# Patient Record
Sex: FEMALE | Race: WHITE | NOT HISPANIC OR LATINO | Employment: FULL TIME | URBAN - METROPOLITAN AREA
[De-identification: names, ages, dates, MRNs, and addresses within clinical notes are randomized per-mention and may not be internally consistent; named-entity substitution may affect disease eponyms.]

---

## 2017-04-10 ENCOUNTER — OFFICE VISIT (OUTPATIENT)
Dept: RETAIL CLINIC | Facility: CLINIC | Age: 22
End: 2017-04-10

## 2017-04-10 DIAGNOSIS — Z11.1 VISIT FOR TB SKIN TEST: Primary | ICD-10-CM

## 2017-04-10 PROCEDURE — 86580 TB INTRADERMAL TEST: CPT | Performed by: NURSE PRACTITIONER

## 2017-04-12 LAB
INDURATION: 0 MM (ref 0–10)
TB SKIN TEST: NEGATIVE

## 2019-06-06 ENCOUNTER — LAB (OUTPATIENT)
Dept: LAB | Facility: HOSPITAL | Age: 24
End: 2019-06-06

## 2019-06-06 ENCOUNTER — TRANSCRIBE ORDERS (OUTPATIENT)
Dept: LAB | Facility: HOSPITAL | Age: 24
End: 2019-06-06

## 2019-06-06 DIAGNOSIS — Z3A.25 25 WEEKS GESTATION OF PREGNANCY: Primary | ICD-10-CM

## 2019-06-06 DIAGNOSIS — Z3A.25 25 WEEKS GESTATION OF PREGNANCY: ICD-10-CM

## 2019-06-06 LAB
BASOPHILS # BLD AUTO: 0.02 10*3/MM3 (ref 0–0.2)
BASOPHILS NFR BLD AUTO: 0.2 % (ref 0–1.5)
DEPRECATED RDW RBC AUTO: 45.9 FL (ref 37–54)
EOSINOPHIL # BLD AUTO: 0.06 10*3/MM3 (ref 0–0.4)
EOSINOPHIL NFR BLD AUTO: 0.7 % (ref 0.3–6.2)
ERYTHROCYTE [DISTWIDTH] IN BLOOD BY AUTOMATED COUNT: 12.5 % (ref 12.3–15.4)
GLUCOSE BLD-MCNC: 78 MG/DL (ref 65–99)
HBV SURFACE AG SERPL QL IA: NORMAL
HCT VFR BLD AUTO: 37.1 % (ref 34–46.6)
HCV AB SER DONR QL: NORMAL
HGB BLD-MCNC: 11.5 G/DL (ref 12–15.9)
HIV1+2 AB SER QL: NORMAL
IMM GRANULOCYTES # BLD AUTO: 0.04 10*3/MM3 (ref 0–0.05)
IMM GRANULOCYTES NFR BLD AUTO: 0.5 % (ref 0–0.5)
LYMPHOCYTES # BLD AUTO: 2.21 10*3/MM3 (ref 0.7–3.1)
LYMPHOCYTES NFR BLD AUTO: 24.9 % (ref 19.6–45.3)
MCH RBC QN AUTO: 31.3 PG (ref 26.6–33)
MCHC RBC AUTO-ENTMCNC: 31 G/DL (ref 31.5–35.7)
MCV RBC AUTO: 101.1 FL (ref 79–97)
MONOCYTES # BLD AUTO: 0.47 10*3/MM3 (ref 0.1–0.9)
MONOCYTES NFR BLD AUTO: 5.3 % (ref 5–12)
NEUTROPHILS # BLD AUTO: 6.06 10*3/MM3 (ref 1.7–7)
NEUTROPHILS NFR BLD AUTO: 68.4 % (ref 42.7–76)
NRBC BLD AUTO-RTO: 0 /100 WBC (ref 0–0.2)
PLATELET # BLD AUTO: 195 10*3/MM3 (ref 140–450)
PMV BLD AUTO: 12.4 FL (ref 6–12)
RBC # BLD AUTO: 3.67 10*6/MM3 (ref 3.77–5.28)
TSH SERPL DL<=0.05 MIU/L-ACNC: 1.61 MIU/ML (ref 0.27–4.2)
WBC NRBC COR # BLD: 8.86 10*3/MM3 (ref 3.4–10.8)

## 2019-06-06 PROCEDURE — 84443 ASSAY THYROID STIM HORMONE: CPT

## 2019-06-06 PROCEDURE — 36415 COLL VENOUS BLD VENIPUNCTURE: CPT

## 2019-06-06 PROCEDURE — 87340 HEPATITIS B SURFACE AG IA: CPT

## 2019-06-06 PROCEDURE — 86850 RBC ANTIBODY SCREEN: CPT

## 2019-06-06 PROCEDURE — 86901 BLOOD TYPING SEROLOGIC RH(D): CPT

## 2019-06-06 PROCEDURE — 82947 ASSAY GLUCOSE BLOOD QUANT: CPT

## 2019-06-06 PROCEDURE — 80081 OBSTETRIC PANEL INC HIV TSTG: CPT

## 2019-06-06 PROCEDURE — 86900 BLOOD TYPING SEROLOGIC ABO: CPT

## 2019-06-06 PROCEDURE — 85025 COMPLETE CBC W/AUTO DIFF WBC: CPT

## 2019-06-06 PROCEDURE — 86592 SYPHILIS TEST NON-TREP QUAL: CPT

## 2019-06-06 PROCEDURE — 86803 HEPATITIS C AB TEST: CPT

## 2019-06-06 PROCEDURE — G0432 EIA HIV-1/HIV-2 SCREEN: HCPCS

## 2019-06-07 LAB
ABO GROUP BLD: NORMAL
BLD GP AB SCN SERPL QL: NEGATIVE
RH BLD: NEGATIVE
RPR SER QL: NORMAL

## 2019-06-08 LAB — RUBV IGG SERPL IA-ACNC: POSITIVE

## 2019-09-06 ENCOUNTER — HOSPITAL ENCOUNTER (INPATIENT)
Facility: HOSPITAL | Age: 24
LOS: 1 days | Discharge: HOME | End: 2019-09-07
Attending: OBSTETRICS & GYNECOLOGY | Admitting: OBSTETRICS & GYNECOLOGY

## 2019-09-06 PROBLEM — Z34.90 PREGNANCY: Status: RESOLVED | Noted: 2019-09-06 | Resolved: 2019-09-06

## 2019-09-06 PROBLEM — Z34.90 PREGNANCY: Status: ACTIVE | Noted: 2019-09-06

## 2019-09-06 LAB
ABO GROUP BLD: NORMAL
ABO GROUP BLD: NORMAL
ALP SERPL-CCNC: 100 U/L (ref 39–117)
ALT SERPL W P-5'-P-CCNC: 39 U/L (ref 1–33)
AMPHET+METHAMPHET UR QL: NEGATIVE
AMPHETAMINES UR QL: NEGATIVE
AST SERPL-CCNC: 30 U/L (ref 1–32)
BARBITURATES UR QL SCN: NEGATIVE
BENZODIAZ UR QL SCN: NEGATIVE
BILIRUB SERPL-MCNC: 0.3 MG/DL (ref 0.2–1.2)
BLD GP AB SCN SERPL QL: NEGATIVE
BUPRENORPHINE SERPL-MCNC: NEGATIVE NG/ML
CANNABINOIDS SERPL QL: NEGATIVE
COCAINE UR QL: NEGATIVE
CREAT BLD-MCNC: 0.52 MG/DL (ref 0.57–1)
DEPRECATED RDW RBC AUTO: 42.5 FL (ref 37–54)
ERYTHROCYTE [DISTWIDTH] IN BLOOD BY AUTOMATED COUNT: 12.3 % (ref 12.3–15.4)
FETAL BLEED: NEGATIVE
HCT VFR BLD AUTO: 37.5 % (ref 34–46.6)
HGB BLD-MCNC: 12.5 G/DL (ref 12–15.9)
LDH SERPL-CCNC: 285 U/L (ref 135–214)
MCH RBC QN AUTO: 31.2 PG (ref 26.6–33)
MCHC RBC AUTO-ENTMCNC: 33.3 G/DL (ref 31.5–35.7)
MCV RBC AUTO: 93.5 FL (ref 79–97)
METHADONE UR QL SCN: NEGATIVE
NUMBER OF DOSES: NORMAL
OPIATES UR QL: NEGATIVE
OXYCODONE UR QL SCN: NEGATIVE
PCP UR QL SCN: NEGATIVE
PLATELET # BLD AUTO: 192 10*3/MM3 (ref 140–450)
PMV BLD AUTO: 12.4 FL (ref 6–12)
PROPOXYPH UR QL: NEGATIVE
RBC # BLD AUTO: 4.01 10*6/MM3 (ref 3.77–5.28)
RH BLD: NEGATIVE
RH BLD: NEGATIVE
T&S EXPIRATION DATE: NORMAL
TRICYCLICS UR QL SCN: NEGATIVE
URATE SERPL-MCNC: 5.5 MG/DL (ref 2.4–5.7)
WBC NRBC COR # BLD: 7.72 10*3/MM3 (ref 3.4–10.8)

## 2019-09-06 PROCEDURE — 84075 ASSAY ALKALINE PHOSPHATASE: CPT | Performed by: NURSE PRACTITIONER

## 2019-09-06 PROCEDURE — 84550 ASSAY OF BLOOD/URIC ACID: CPT | Performed by: NURSE PRACTITIONER

## 2019-09-06 PROCEDURE — 80306 DRUG TEST PRSMV INSTRMNT: CPT | Performed by: ADVANCED PRACTICE MIDWIFE

## 2019-09-06 PROCEDURE — 84450 TRANSFERASE (AST) (SGOT): CPT | Performed by: NURSE PRACTITIONER

## 2019-09-06 PROCEDURE — 83615 LACTATE (LD) (LDH) ENZYME: CPT | Performed by: NURSE PRACTITIONER

## 2019-09-06 PROCEDURE — 82247 BILIRUBIN TOTAL: CPT | Performed by: NURSE PRACTITIONER

## 2019-09-06 PROCEDURE — 86900 BLOOD TYPING SEROLOGIC ABO: CPT | Performed by: NURSE PRACTITIONER

## 2019-09-06 PROCEDURE — 86901 BLOOD TYPING SEROLOGIC RH(D): CPT | Performed by: ADVANCED PRACTICE MIDWIFE

## 2019-09-06 PROCEDURE — 85027 COMPLETE CBC AUTOMATED: CPT | Performed by: NURSE PRACTITIONER

## 2019-09-06 PROCEDURE — 25010000002 RHO D IMMUNE GLOBULIN 1500 UNIT/2ML SOLUTION PREFILLED SYRINGE: Performed by: ADVANCED PRACTICE MIDWIFE

## 2019-09-06 PROCEDURE — 84460 ALANINE AMINO (ALT) (SGPT): CPT | Performed by: NURSE PRACTITIONER

## 2019-09-06 PROCEDURE — 86901 BLOOD TYPING SEROLOGIC RH(D): CPT | Performed by: NURSE PRACTITIONER

## 2019-09-06 PROCEDURE — 59025 FETAL NON-STRESS TEST: CPT

## 2019-09-06 PROCEDURE — 86850 RBC ANTIBODY SCREEN: CPT | Performed by: NURSE PRACTITIONER

## 2019-09-06 PROCEDURE — 82565 ASSAY OF CREATININE: CPT | Performed by: NURSE PRACTITIONER

## 2019-09-06 PROCEDURE — 85461 HEMOGLOBIN FETAL: CPT | Performed by: ADVANCED PRACTICE MIDWIFE

## 2019-09-06 PROCEDURE — 86900 BLOOD TYPING SEROLOGIC ABO: CPT | Performed by: ADVANCED PRACTICE MIDWIFE

## 2019-09-06 RX ORDER — ONDANSETRON 4 MG/1
4 TABLET, FILM COATED ORAL EVERY 8 HOURS PRN
Status: DISCONTINUED | OUTPATIENT
Start: 2019-09-06 | End: 2019-09-07 | Stop reason: HOSPADM

## 2019-09-06 RX ORDER — MAGNESIUM CARB/ALUMINUM HYDROX 105-160MG
30 TABLET,CHEWABLE ORAL ONCE
Status: DISCONTINUED | OUTPATIENT
Start: 2019-09-06 | End: 2019-09-06 | Stop reason: HOSPADM

## 2019-09-06 RX ORDER — ACETAMINOPHEN 325 MG/1
650 TABLET ORAL EVERY 4 HOURS PRN
Status: DISCONTINUED | OUTPATIENT
Start: 2019-09-06 | End: 2019-09-06 | Stop reason: HOSPADM

## 2019-09-06 RX ORDER — OXYCODONE AND ACETAMINOPHEN 10; 325 MG/1; MG/1
2 TABLET ORAL EVERY 4 HOURS PRN
Status: DISCONTINUED | OUTPATIENT
Start: 2019-09-06 | End: 2019-09-06 | Stop reason: HOSPADM

## 2019-09-06 RX ORDER — IBUPROFEN 600 MG/1
600 TABLET ORAL EVERY 6 HOURS PRN
Status: DISCONTINUED | OUTPATIENT
Start: 2019-09-06 | End: 2019-09-07 | Stop reason: HOSPADM

## 2019-09-06 RX ORDER — OXYTOCIN-SODIUM CHLORIDE 0.9% IV SOLN 30 UNIT/500ML 30-0.9/5 UT/ML-%
650 SOLUTION INTRAVENOUS ONCE
Status: COMPLETED | OUTPATIENT
Start: 2019-09-06 | End: 2019-09-06

## 2019-09-06 RX ORDER — PROMETHAZINE HYDROCHLORIDE 12.5 MG/1
12.5 TABLET ORAL EVERY 6 HOURS PRN
Status: DISCONTINUED | OUTPATIENT
Start: 2019-09-06 | End: 2019-09-06 | Stop reason: HOSPADM

## 2019-09-06 RX ORDER — ONDANSETRON 4 MG/1
4 TABLET, FILM COATED ORAL EVERY 6 HOURS PRN
Status: DISCONTINUED | OUTPATIENT
Start: 2019-09-06 | End: 2019-09-06 | Stop reason: HOSPADM

## 2019-09-06 RX ORDER — SODIUM CHLORIDE 0.9 % (FLUSH) 0.9 %
3 SYRINGE (ML) INJECTION EVERY 12 HOURS SCHEDULED
Status: DISCONTINUED | OUTPATIENT
Start: 2019-09-06 | End: 2019-09-06 | Stop reason: HOSPADM

## 2019-09-06 RX ORDER — DOCUSATE SODIUM 100 MG/1
100 CAPSULE, LIQUID FILLED ORAL 2 TIMES DAILY PRN
Status: DISCONTINUED | OUTPATIENT
Start: 2019-09-06 | End: 2019-09-07 | Stop reason: HOSPADM

## 2019-09-06 RX ORDER — PRENATAL WITH FERROUS FUM AND FOLIC ACID 3080; 920; 120; 400; 22; 1.84; 3; 20; 10; 1; 12; 200; 27; 25; 2 [IU]/1; [IU]/1; MG/1; [IU]/1; MG/1; MG/1; MG/1; MG/1; MG/1; MG/1; UG/1; MG/1; MG/1; MG/1; MG/1
1 TABLET ORAL DAILY
COMMUNITY

## 2019-09-06 RX ORDER — OXYTOCIN-SODIUM CHLORIDE 0.9% IV SOLN 30 UNIT/500ML 30-0.9/5 UT/ML-%
85 SOLUTION INTRAVENOUS ONCE
Status: COMPLETED | OUTPATIENT
Start: 2019-09-06 | End: 2019-09-06

## 2019-09-06 RX ORDER — CARBOPROST TROMETHAMINE 250 UG/ML
250 INJECTION, SOLUTION INTRAMUSCULAR AS NEEDED
Status: DISCONTINUED | OUTPATIENT
Start: 2019-09-06 | End: 2019-09-06 | Stop reason: HOSPADM

## 2019-09-06 RX ORDER — PRENATAL VIT/IRON FUM/FOLIC AC 27MG-0.8MG
1 TABLET ORAL DAILY
Status: DISCONTINUED | OUTPATIENT
Start: 2019-09-06 | End: 2019-09-07 | Stop reason: HOSPADM

## 2019-09-06 RX ORDER — MISOPROSTOL 200 UG/1
800 TABLET ORAL AS NEEDED
Status: DISCONTINUED | OUTPATIENT
Start: 2019-09-06 | End: 2019-09-06 | Stop reason: HOSPADM

## 2019-09-06 RX ORDER — SODIUM CHLORIDE 0.9 % (FLUSH) 0.9 %
1-10 SYRINGE (ML) INJECTION AS NEEDED
Status: DISCONTINUED | OUTPATIENT
Start: 2019-09-06 | End: 2019-09-07 | Stop reason: HOSPADM

## 2019-09-06 RX ORDER — ACETAMINOPHEN 325 MG/1
650 TABLET ORAL EVERY 4 HOURS PRN
Status: DISCONTINUED | OUTPATIENT
Start: 2019-09-06 | End: 2019-09-07 | Stop reason: HOSPADM

## 2019-09-06 RX ORDER — SODIUM CHLORIDE 0.9 % (FLUSH) 0.9 %
3-10 SYRINGE (ML) INJECTION AS NEEDED
Status: DISCONTINUED | OUTPATIENT
Start: 2019-09-06 | End: 2019-09-06 | Stop reason: HOSPADM

## 2019-09-06 RX ORDER — SODIUM CHLORIDE, SODIUM LACTATE, POTASSIUM CHLORIDE, CALCIUM CHLORIDE 600; 310; 30; 20 MG/100ML; MG/100ML; MG/100ML; MG/100ML
125 INJECTION, SOLUTION INTRAVENOUS CONTINUOUS
Status: DISCONTINUED | OUTPATIENT
Start: 2019-09-06 | End: 2019-09-06

## 2019-09-06 RX ORDER — BISACODYL 10 MG
10 SUPPOSITORY, RECTAL RECTAL DAILY PRN
Status: DISCONTINUED | OUTPATIENT
Start: 2019-09-07 | End: 2019-09-07 | Stop reason: HOSPADM

## 2019-09-06 RX ORDER — PROMETHAZINE HYDROCHLORIDE 12.5 MG/1
12.5 SUPPOSITORY RECTAL EVERY 6 HOURS PRN
Status: DISCONTINUED | OUTPATIENT
Start: 2019-09-06 | End: 2019-09-06 | Stop reason: HOSPADM

## 2019-09-06 RX ORDER — PROMETHAZINE HYDROCHLORIDE 25 MG/ML
12.5 INJECTION, SOLUTION INTRAMUSCULAR; INTRAVENOUS EVERY 6 HOURS PRN
Status: DISCONTINUED | OUTPATIENT
Start: 2019-09-06 | End: 2019-09-06 | Stop reason: HOSPADM

## 2019-09-06 RX ORDER — METHYLERGONOVINE MALEATE 0.2 MG/ML
200 INJECTION INTRAVENOUS ONCE AS NEEDED
Status: DISCONTINUED | OUTPATIENT
Start: 2019-09-06 | End: 2019-09-06 | Stop reason: HOSPADM

## 2019-09-06 RX ORDER — BUTORPHANOL TARTRATE 1 MG/ML
1 INJECTION, SOLUTION INTRAMUSCULAR; INTRAVENOUS
Status: DISCONTINUED | OUTPATIENT
Start: 2019-09-06 | End: 2019-09-06 | Stop reason: HOSPADM

## 2019-09-06 RX ORDER — ONDANSETRON 2 MG/ML
4 INJECTION INTRAMUSCULAR; INTRAVENOUS EVERY 6 HOURS PRN
Status: DISCONTINUED | OUTPATIENT
Start: 2019-09-06 | End: 2019-09-06 | Stop reason: HOSPADM

## 2019-09-06 RX ORDER — IBUPROFEN 600 MG/1
600 TABLET ORAL EVERY 6 HOURS PRN
Status: DISCONTINUED | OUTPATIENT
Start: 2019-09-06 | End: 2019-09-06 | Stop reason: HOSPADM

## 2019-09-06 RX ADMIN — IBUPROFEN 600 MG: 600 TABLET, FILM COATED ORAL at 21:29

## 2019-09-06 RX ADMIN — Medication: at 09:31

## 2019-09-06 RX ADMIN — WITCH HAZEL 1 PAD: 500 SOLUTION RECTAL; TOPICAL at 09:31

## 2019-09-06 RX ADMIN — DOCUSATE SODIUM 100 MG: 100 CAPSULE, LIQUID FILLED ORAL at 09:29

## 2019-09-06 RX ADMIN — IBUPROFEN 600 MG: 600 TABLET, FILM COATED ORAL at 09:29

## 2019-09-06 RX ADMIN — SODIUM CHLORIDE, POTASSIUM CHLORIDE, SODIUM LACTATE AND CALCIUM CHLORIDE 125 ML/HR: 600; 310; 30; 20 INJECTION, SOLUTION INTRAVENOUS at 06:58

## 2019-09-06 RX ADMIN — PRENATAL VIT W/ FE FUMARATE-FA TAB 27-0.8 MG 1 TABLET: 27-0.8 TAB at 09:29

## 2019-09-06 RX ADMIN — OXYTOCIN 650 ML/HR: 10 INJECTION INTRAVENOUS at 07:32

## 2019-09-06 RX ADMIN — HUMAN RHO(D) IMMUNE GLOBULIN 1500 UNITS: 1500 SOLUTION INTRAMUSCULAR; INTRAVENOUS at 17:41

## 2019-09-06 RX ADMIN — OXYTOCIN 85 ML/HR: 10 INJECTION INTRAVENOUS at 08:33

## 2019-09-06 RX ADMIN — HYDROCORTISONE 2.5% 1 APPLICATION: 25 CREAM TOPICAL at 09:31

## 2019-09-06 NOTE — L&D DELIVERY NOTE
[facility]  Vaginal Delivery Note    Delivery     Delivery: Vaginal, Spontaneous     YOB: 2020    Time of Birth:  Gestational Age 7:26 AM   39w0d     Anesthesia: None     Delivering clinician: Gely Do    Forceps?   No   Vacuum? No    Shoulder dystocia present: No        Delivery narrative:  Laura pushed effectively to deliver a live born male infant over one contraction over an intact perineum with no anesthesia. Infant vigorous at delivery and placed on maternal abdomen where after cord stopped pulsing and was milked x4 cord was double clamped and cut by patient. Placenta delivered spontaneously and was visually intact. Fundus firm. Careful inspection of the perineum reveal no lacerations which required repair.     Infant    Findings: male  infant     Infant observations: Weight: 3170 g (6 lb 15.8 oz)   Length: 18.5  in  Observations/Comments:         Apgars: 8   @ 1 minute /    9   @ 5 minutes   Infant Name: Nash     Placenta, Cord, and Fluid    Placenta delivered  Spontaneous  at   9/6/2019  7:32 AM     Cord:    present.   Nuchal Cord?  no   Cord blood obtained:   yes   Cord gases obtained:    no             Repair    Episiotomy: None    Lacerations: No   Estimated Blood Loss:  100ml           Complications  none    Disposition  Mother to Mother Baby/Postpartum  in stable condition currently.  Baby to remains with mom  in stable condition currently.      Gely Do CNM  09/06/19  7:45 AM

## 2019-09-06 NOTE — H&P
[facility]  Obstetric History and Physical    Chief Complaint   Patient presents with   • Rupture of Membranes       Subjective     Patient is a 24 y.o. female  currently at 39w0d, who presents with SROM at 0500. She reports clear fluid. She reports good fetal movement. She denies vaginal bleeding. She has had one prenatal visit with DOLORES Garcia at 19 weeks. She did not receive rhogam this pregnancy.   Her prenatal care is complicated by  insufficient prenatal care .  Her previous obstetric/gynecological history is noted for three previous natural vaginal deliveries.    The following portions of the patients history were reviewed and updated as appropriate: current medications, allergies, past medical history, past surgical history, past family history, past social history and problem list .       Prenatal Information:  Prenatal Results     Initial Prenatal Labs     Test Value Reference Range Date Time    Hemoglobin        Hematocrit        Platelets 192 10*3/mm3 140 - 450 10*3/mm3 19 0658    Rubella IgG Positive   19 1209    Hepatitis B SAg Non-Reactive  Non-Reactive 19 1209    Hepatitis C Ab Non-Reactive  Non-Reactive 19 1209    RPR Non-Reactive  Non-Reactive 19 1209    ABO A   19 1209    Rh Negative   19 1209    Antibody Screen        HIV Non-Reactive  Non-Reactive 19 1209    Urine Culture        Gonorrhea        Chlamydia        TSH 1.610 mIU/mL 0.270 - 4.200 mIU/mL 19 1209          2nd and 3rd Trimester     Test Value Reference Range Date Time    Hemoglobin (repeated) 12.5 g/dL 12.0 - 15.9 g/dL 19 0658    Hematocrit (repeated) 37.5 % 34.0 - 46.6 % 19 0658    GCT        Antibody Screen (repeated) Negative   19 1209    GTT Fasting        GTT 1 Hr        GTT 2 Hr        GTT 3 Hr        Group B Strep              Drug Screening     Test Value Reference Range Date Time    Amphetamine Screen        Barbiturate Screen        Benzodiazepine  Screen        Methadone Screen        Phencyclidine Screen        Opiates Screen        THC Screen        Cocaine Screen        Propoxyphene Screen        Buprenorphine Screen        Methamphetamine Screen        Oxycodone Screen        Tricyclic Antidepressants Screen              Other (Risk screening)     Test Value Reference Range Date Time    Varicella IgG        Parvovirus IgG        CMV IgG        Cystic Fibrosis        Hemoglobin electrophoresis        NIPT        MSAFP-4        AFP (for NTD only)                  External Prenatal Results     Pregnancy Outside Results - Transcribed From Office Records - See Scanned Records For Details     Test Value Date Time    Hgb 12.5 g/dL 09/06/19 0658    Hct 37.5 % 09/06/19 0658    ABO A  06/06/19 1209    Rh Negative  06/06/19 1209    Antibody Screen Negative  06/06/19 1209    Glucose Fasting GTT       Glucose Tolerance Test 1 hour       Glucose Tolerance Test 3 hour       Gonorrhea (discrete)       Chlamydia (discrete)       RPR Non-Reactive  06/06/19 1209    VDRL       Syphilis Antibody       Rubella Positive  06/06/19 1209    HBsAg Non-Reactive  06/06/19 1209    Herpes Simplex Virus PCR       Herpes Simplex VIrus Culture       HIV Non-Reactive  06/06/19 1209    Hep C RNA Quant PCR       Hep C Antibody Non-Reactive  06/06/19 1209    AFP       Group B Strep       GBS Susceptibility to Clindamycin       GBS Susceptibility to Erythromycin       Fetal Fibronectin       Genetic Testing, Maternal Blood             Drug Screening     Test Value Date Time    Urine Drug Screen       Amphetamine Screen Negative ng/mL 09/29/14 0915    Barbiturate Screen Negative ng/mL 09/29/14 0915    Benzodiazepine Screen Negative ng/mL 09/29/14 0915    Methadone Screen Negative ng/mL 09/29/14 0915    Phencyclidine Screen Negative ng/mL 09/29/14 0915    Opiates Screen       THC Screen       Cocaine Screen       Propoxyphene Screen Negative ng/mL 09/29/14 0915    Buprenorphine Screen Negative  ng/mL 14 0915    Methamphetamine Screen       Oxycodone Screen Negative ng/mL 14 0915    Tricyclic Antidepressants Screen                    Past OB History:     Obstetric History       T3      L3     SAB0   TAB0   Ectopic0   Molar0   Multiple0   Live Births3       # Outcome Date GA Lbr Antoni/2nd Weight Sex Delivery Anes PTL Lv   4 Current            3 Term     M Vag-Spont   VERONIKA   2 Term     F Vag-Spont   VERONIKA   1 Term     F Vag-Spont   VERNOIKA          Past Medical History: History reviewed. No pertinent past medical history.   Past Surgical History Past Surgical History:   Procedure Laterality Date   • LAPAROSCOPIC CHOLECYSTECTOMY     • WISDOM TOOTH EXTRACTION        Family History: No family history on file.   Social History:  reports that she has never smoked. She has never used smokeless tobacco.   reports that she does not drink alcohol.   reports that she does not use drugs.        Review of Systems      Objective     Vital Signs Range for the last 24 hours  Temperature: Temp:  [98.1 °F (36.7 °C)] 98.1 °F (36.7 °C)   Temp Source: Temp src: Oral   BP: BP: (147-154)/() 154/87   Pulse: Heart Rate:  [86-87] 87   Respirations: Resp:  [18] 18   SPO2:     O2 Amount (l/min):     O2 Devices     Weight: Weight:  [115 kg (254 lb)] 115 kg (254 lb)     Physical Examination: General appearance - alert, well appearing, and in no distress  Mental status - alert, oriented to person, place, and time  Chest - clear to auscultation, no wheezes, rales or rhonchi, symmetric air entry  Heart - normal rate, regular rhythm, normal S1, S2, no murmurs, rubs, clicks or gallops  Abdomen - soft, nontender, nondistended, no masses or organomegaly  Musculoskeletal - no joint tenderness, deformity or swelling  Extremities - peripheral pulses normal, 1+ pedal edema, no clubbing or cyanosis  Skin - normal coloration and turgor, no rashes, no suspicious skin lesions noted    Presentation: vertex   Cervix: Exam by:  s  perfecto ventura   Dilation: Cervical Dilation (cm): 9   Effacement: Cervical Effacement: 80-90%   Station:  0     Fetal Heart Rate Assessment   Method: Fetal HR Assessment Method: external   Beats/min: Fetal HR (beats/min): 135   Baseline: Fetal Heart Baseline Rate: normal range   Variability: Fetal HR Variability: moderate (amplitude range 6 to 25 bpm)   Accels: Fetal HR Accelerations: greater than/equal to 15 bpm, lasting at least 15 seconds   Decels: Fetal HR Decelerations: absent   Tracing Category:       Uterine Assessment   Method: Method: external tocotransducer   Frequency (min):     Ctx Count in 10 min:     Duration:     Intensity: Contraction Intensity: no contractions   Intensity by IUPC:     Resting Tone: Uterine Resting Tone: soft by palpation   Resting Tone by IUPC:     [place] Units:         Assessment/Plan       Pregnancy      Assessment & Plan    Assessment:  1.  Intrauterine pregnancy at 39w0d gestation with reactive, reassuring fetal status.    2.  labor  with ROM  3.  Obstetrical history significant for three previous full term vaginal deliveries, one prenatal visit at 19 weeks this pregnancy.  4.  GBS status: unknown    Plan:  1. fetal and uterine monitoring  continuously and expectant management  2. Plan of care has been reviewed with patient   3.  Risks, benefits of treatment plan have been discussed.  4.  All questions have been answered.        Gely Do CNM  9/6/2019  7:40 AM

## 2019-09-06 NOTE — CONSULTS
Continued Stay Note  [facility]     Patient Name: Laura Riley  MRN: [id number]  Today's Date: 9/6/2019    Admit Date: 9/6/2019    Discharge Plan     Row Name 09/06/19 1543       Plan    Plan Comments  Spoke with pt. Reports she plans to have an adoption plan. [facility]         Discharge Codes    No documentation.             AMY Barney

## 2019-09-07 VITALS
HEART RATE: 65 BPM | BODY MASS INDEX: 37.62 KG/M2 | HEIGHT: 69 IN | TEMPERATURE: 97.9 F | WEIGHT: 254 LBS | DIASTOLIC BLOOD PRESSURE: 72 MMHG | SYSTOLIC BLOOD PRESSURE: 111 MMHG | RESPIRATION RATE: 16 BRPM

## 2019-09-07 LAB
BASOPHILS # BLD AUTO: 0.03 10*3/MM3 (ref 0–0.2)
BASOPHILS NFR BLD AUTO: 0.4 % (ref 0–1.5)
DEPRECATED RDW RBC AUTO: 45.2 FL (ref 37–54)
EOSINOPHIL # BLD AUTO: 0.09 10*3/MM3 (ref 0–0.4)
EOSINOPHIL NFR BLD AUTO: 1.1 % (ref 0.3–6.2)
ERYTHROCYTE [DISTWIDTH] IN BLOOD BY AUTOMATED COUNT: 12.6 % (ref 12.3–15.4)
HCT VFR BLD AUTO: 32.9 % (ref 34–46.6)
HCT VFR BLD AUTO: 32.9 % (ref 34–46.6)
HGB BLD-MCNC: 10.3 G/DL (ref 12–15.9)
HGB BLD-MCNC: 10.3 G/DL (ref 12–15.9)
IMM GRANULOCYTES # BLD AUTO: 0.04 10*3/MM3 (ref 0–0.05)
IMM GRANULOCYTES NFR BLD AUTO: 0.5 % (ref 0–0.5)
LYMPHOCYTES # BLD AUTO: 2.38 10*3/MM3 (ref 0.7–3.1)
LYMPHOCYTES NFR BLD AUTO: 27.9 % (ref 19.6–45.3)
MCH RBC QN AUTO: 30.9 PG (ref 26.6–33)
MCHC RBC AUTO-ENTMCNC: 31.3 G/DL (ref 31.5–35.7)
MCV RBC AUTO: 98.8 FL (ref 79–97)
MONOCYTES # BLD AUTO: 0.52 10*3/MM3 (ref 0.1–0.9)
MONOCYTES NFR BLD AUTO: 6.1 % (ref 5–12)
NEUTROPHILS # BLD AUTO: 5.48 10*3/MM3 (ref 1.7–7)
NEUTROPHILS NFR BLD AUTO: 64 % (ref 42.7–76)
NRBC BLD AUTO-RTO: 0 /100 WBC (ref 0–0.2)
PLATELET # BLD AUTO: 154 10*3/MM3 (ref 140–450)
PMV BLD AUTO: 12.8 FL (ref 6–12)
RBC # BLD AUTO: 3.33 10*6/MM3 (ref 3.77–5.28)
WBC NRBC COR # BLD: 8.54 10*3/MM3 (ref 3.4–10.8)

## 2019-09-07 PROCEDURE — 85018 HEMOGLOBIN: CPT | Performed by: NURSE PRACTITIONER

## 2019-09-07 PROCEDURE — 85014 HEMATOCRIT: CPT | Performed by: NURSE PRACTITIONER

## 2019-09-07 PROCEDURE — 85025 COMPLETE CBC W/AUTO DIFF WBC: CPT | Performed by: NURSE PRACTITIONER

## 2019-09-07 RX ORDER — IBUPROFEN 600 MG/1
600 TABLET ORAL EVERY 6 HOURS PRN
Qty: 60 TABLET | Refills: 0 | Status: SHIPPED | OUTPATIENT
Start: 2019-09-07

## 2019-09-07 RX ADMIN — DOCUSATE SODIUM 100 MG: 100 CAPSULE, LIQUID FILLED ORAL at 08:49

## 2019-09-07 RX ADMIN — PRENATAL VIT W/ FE FUMARATE-FA TAB 27-0.8 MG 1 TABLET: 27-0.8 TAB at 08:49

## 2019-09-07 NOTE — PROGRESS NOTES
[facility]  Vaginal Delivery Progress Note    Subjective     Doing well, pain controlled, lochia less than menses  Baby up for adoption. Patient would like to be discharged home today.    Objective     Vital Signs Range for the last 24 hours  Temperature: Temp:  [97.8 °F (36.6 °C)-98.3 °F (36.8 °C)] 97.9 °F (36.6 °C)   Temp Source: Temp src: Oral   BP: BP: (107-154)/() 125/58   Pulse: Heart Rate:  [64-94] 94   Respirations: Resp:  [16-18] 16   SPO2:     O2 Amount (l/min):     O2 Devices           Physical Exam:  General:  no acute distresss.  Abdomen: Soft, non-tender, fundus firm  Extremities: normal, atraumatic, no cyanosis, and trace edema.       Lab results reviewed:  Yes    Lab Results   Component Value Date    WBC 7.72 2019    HGB 12.5 2019    HCT 37.5 2019    MCV 93.5 2019     2019         Assessment/Plan        (normal spontaneous vaginal delivery)      Laura Riley is Day 1  post-partum       Plan:  Discharge home with standard precautions and return to clinic in 4-6 weeks.  Will review postpartum labs prior to discharge    Gely Do CNM  2019  6:11 AM

## 2019-09-07 NOTE — DISCHARGE SUMMARY
[facility]  Delivery Discharge Summary    Primary OB Clinician:     EDC: Estimated Date of Delivery: 19    Gestational Age:39w0d    Date of Admission: 2019    Admission Diagnosis:  labor    Discharge Diagnosis: Same    Date of Delivery: 2019   Time of Delivery: 7:26 AM     Delivered By:  Gely Do     Delivery Type: Vaginal, Spontaneous          Baby:@BABYNOHDR(SEX)@ infant;   Apgar:  8   @ 1 minute /   Apgar:  9   @ 5 minutes   Weight: @BABYNOHDR(BIRTHWEIGHT)@   Length: @BABYNOHDR(DELRECITEM,HSB,28743,,,,)@    Anesthesia: None      Laceration: No    Exam:  Normal postpartum exam    Hospital Course:  Hospital course has been stable.  Patient is tolerating po, voiding without difficulty and ready for discharge.  Please see medication reconciliation and lab report for additional details.  Infant placed for adoption.    Follow Up:  Patient has been given a follow up appointment in our office in 4 weeks. Pelvic rest for 6 weeks. Postpartum depression, mastitis, and lochia precautions reviewed.    Discharge Date: 2019; Discharge Time: 6:31 AM

## 2019-09-07 NOTE — PLAN OF CARE
Problem: Patient Care Overview  Goal: Plan of Care Review  Outcome: Ongoing (interventions implemented as appropriate)   09/07/19 2370   Coping/Psychosocial   Plan of Care Reviewed With patient   Plan of Care Review   Progress improving   OTHER   Outcome Summary doing well, VSS, voiding without problems, wants to go home today , very caring with baby and very hopeful that she picked good parents for her child.       Problem: Postpartum (Vaginal Delivery) (Adult,Obstetrics,Pediatric)  Goal: Signs and Symptoms of Listed Potential Problems Will be Absent, Minimized or Managed (Postpartum)  Outcome: Ongoing (interventions implemented as appropriate)

## 2022-08-01 ENCOUNTER — TELEPHONE (OUTPATIENT)
Dept: FAMILY MEDICINE CLINIC | Facility: CLINIC | Age: 27
End: 2022-08-01

## 2023-02-23 ENCOUNTER — OFFICE VISIT (OUTPATIENT)
Dept: INTERNAL MEDICINE | Facility: CLINIC | Age: 28
End: 2023-02-23
Payer: COMMERCIAL

## 2023-02-23 VITALS
SYSTOLIC BLOOD PRESSURE: 138 MMHG | TEMPERATURE: 98.2 F | DIASTOLIC BLOOD PRESSURE: 92 MMHG | BODY MASS INDEX: 44.41 KG/M2 | WEIGHT: 293 LBS | HEIGHT: 68 IN

## 2023-02-23 DIAGNOSIS — E66.01 MORBID (SEVERE) OBESITY DUE TO EXCESS CALORIES: ICD-10-CM

## 2023-02-23 DIAGNOSIS — R03.0 ELEVATED BLOOD-PRESSURE READING WITHOUT DIAGNOSIS OF HYPERTENSION: ICD-10-CM

## 2023-02-23 DIAGNOSIS — Z00.00 WELL ADULT EXAM: Primary | ICD-10-CM

## 2023-02-23 PROCEDURE — 99204 OFFICE O/P NEW MOD 45 MIN: CPT | Performed by: STUDENT IN AN ORGANIZED HEALTH CARE EDUCATION/TRAINING PROGRAM

## 2023-02-23 NOTE — PROGRESS NOTES
Office Note     Name: Laura Riley    : 1995     MRN: [id number]     Chief Complaint  Hypertension    Subjective     History of Present Illness:  Laura Riley is a 28 y.o. female who presents today for     Here to establish care  Concerned about BP    evaluation of elevated BP measurement. Onset was unknown. with stable since that time. Symptoms include feels intermittent facial flushing. Specifically denies blurred vision, chest pain, headache and palpitations. Cardiovascular risk factors are obesity (BMI >= 30 kg/m2). Family history is positive for hypertension and diabetes mellitus. Agents associated with hypertension include none. No, syncope, no shortness of breath, Never been on blood pressure medications    Describes her diet as eating take out food, cooks at home but prepares a lot of food fried.    Review of Systems:   Review of Systems   All other systems reviewed and are negative.      Past Medical History: History reviewed. No pertinent past medical history.    Past Surgical History:   Past Surgical History:   Procedure Laterality Date   • LAPAROSCOPIC CHOLECYSTECTOMY     • WISDOM TOOTH EXTRACTION         Family History:   Family History   Problem Relation Age of Onset   • Hypertension Mother        Social History:   Social History     Socioeconomic History   • Marital status: Single   Tobacco Use   • Smoking status: Never   • Smokeless tobacco: Never   Vaping Use   • Vaping Use: Never used   Substance and Sexual Activity   • Alcohol use: No   • Drug use: No   • Sexual activity: Defer       Immunizations:   Immunization History   Administered Date(s) Administered   • COVID-19 (MODERNA) 1st, 2nd, 3rd Dose Only 2021, 10/19/2021   • PPD Test 04/10/2017   • Rho (D) Immune Globulin 2019        Medications:     Current Outpatient Medications:   •  ibuprofen (ADVIL,MOTRIN) 600 MG tablet, Take 1 tablet by mouth Every 6 (Six) Hours As Needed for Mild Pain ., Disp: 60 tablet, Rfl:  "0  •  Prenatal Vit-Fe Fumarate-FA (PRENATAL 27-1) 27-1 MG tablet tablet, Take 1 tablet by mouth Daily., Disp: , Rfl:     Allergies:   Allergies   Allergen Reactions   • Bactrim [Sulfamethoxazole-Trimethoprim] Hives       Objective     Vital Signs  /92   Temp 98.2 °F (36.8 °C) (Temporal)   Ht 173 cm (68.11\")   Wt 135 kg (297 lb 12.8 oz)   BMI 45.13 kg/m²   Estimated body mass index is 45.13 kg/m² as calculated from the following:    Height as of this encounter: 173 cm (68.11\").    Weight as of this encounter: 135 kg (297 lb 12.8 oz).    Class 3 Severe Obesity (BMI >=40). Obesity-related health conditions include the following: none. Obesity is unchanged. BMI is is above average; BMI management plan is completed. We discussed low calorie, low carb based diet program, portion control and increasing exercise.      Physical Exam  Constitutional:       Appearance: Normal appearance. She is obese.   Cardiovascular:      Rate and Rhythm: Normal rate and regular rhythm.      Pulses: Normal pulses.      Heart sounds: Normal heart sounds.   Pulmonary:      Effort: Pulmonary effort is normal.      Breath sounds: Normal breath sounds.   Skin:     General: Skin is warm.      Capillary Refill: Capillary refill takes less than 2 seconds.   Neurological:      General: No focal deficit present.      Mental Status: She is alert and oriented to person, place, and time. Mental status is at baseline.   Psychiatric:         Mood and Affect: Mood normal.         Behavior: Behavior normal.          Result Review :                  Assessment and Plan     1. Well adult exam  Counseling was given to patient for the following topics: appropriate exercise, healthy eating habits, disease prevention and preventative screenings and importance of immunizations, including risks and benefits  - Comprehensive metabolic panel; Future  - Lipid panel; Future  - CBC w AUTO Differential; Future  - TSH; Future  - T4, free; Future    2. Elevated " blood-pressure reading without diagnosis of hypertension  Monitor home BP  Discussed low salt diet, increase physical activity and weight control  -will check at next visit if systolic >140, consider starting anti-htn.   - Comprehensive metabolic panel; Future    3. Morbid (severe) obesity due to excess calories (HCC)  Discussed risk associated with obesity. she was given instructions on calorie counting as well as on decreasing carbohydrate intake. she was also encouraged to start exercise regimen.  Instructed patient to download fitness magdalena on her smart phone to aid in calorie counting and exercise tracking.    - Comprehensive metabolic panel; Future       Follow Up  Return in about 3 months (around 5/23/2023) for HTN.    MD PADMINI RomeroE [address]  [facility]  [address]  [address] [address] [phone]

## 2023-10-16 ENCOUNTER — TELEPHONE (OUTPATIENT)
Dept: OBSTETRICS AND GYNECOLOGY | Facility: CLINIC | Age: 28
End: 2023-10-16
Payer: COMMERCIAL

## 2023-10-25 ENCOUNTER — TELEPHONE (OUTPATIENT)
Dept: OBSTETRICS AND GYNECOLOGY | Facility: CLINIC | Age: 28
End: 2023-10-25
Payer: COMMERCIAL

## 2023-10-26 ENCOUNTER — INITIAL PRENATAL (OUTPATIENT)
Dept: OBSTETRICS AND GYNECOLOGY | Facility: CLINIC | Age: 28
End: 2023-10-26
Payer: COMMERCIAL

## 2023-10-26 VITALS — WEIGHT: 293 LBS | BODY MASS INDEX: 45.71 KG/M2 | SYSTOLIC BLOOD PRESSURE: 142 MMHG | DIASTOLIC BLOOD PRESSURE: 88 MMHG

## 2023-10-26 DIAGNOSIS — O09.40 ENCOUNTER FOR SUPERVISION OF HIGH RISK PREGNANCY WITH GRAND MULTIPARITY, ANTEPARTUM: ICD-10-CM

## 2023-10-26 DIAGNOSIS — I10 CHRONIC HYPERTENSION: ICD-10-CM

## 2023-10-26 DIAGNOSIS — Z36.9 ENCOUNTER FOR ANTENATAL SCREENING: Primary | ICD-10-CM

## 2023-10-26 DIAGNOSIS — E66.01 MORBID OBESITY WITH BMI OF 45.0-49.9, ADULT: ICD-10-CM

## 2023-10-26 RX ORDER — LABETALOL 100 MG/1
100 TABLET, FILM COATED ORAL 2 TIMES DAILY
Qty: 60 TABLET | Refills: 3 | Status: SHIPPED | OUTPATIENT
Start: 2023-10-26

## 2023-10-26 RX ORDER — D-METHORPHAN/PE/ACETAMINOPHEN 10-5-325MG
1 CAPSULE ORAL DAILY
COMMUNITY

## 2023-10-26 NOTE — PROGRESS NOTES
Initial ob visit     CC- Here for care of pregnancy        Laura Riley is a 28 y.o. female, , who presents for her first obstetrical visit. Patient's last menstrual period was 2023. Her KAREEM is 2024, by Last Menstrual Period. Current GA is 22w0d.     Initial positive test date : 2023, UPT        Her periods are: every 4 weeks  Prior obstetric issues: H/o Oligohydramnios, IUGR, and PTD at 35 wks with G4  Patient's past medical history is significant for:  Elevated BP (138/92) in 2023 with PCP- no medicated. Patient was to f/u with PCP in 3 months. Patient did not f/u with PCP.   .  Family history of genetic issues (includes FOB): None  Prior infections concerning in pregnancy (Rash, fever in last 2 weeks): No  Varicella Hx - vaccinated  Prior testing for Cystic Fibrosis Carrier or Sickle Cell Trait- None  Prepregnancy BMI - Body mass index is 45.71 kg/m².  History of STD: No  Hx of HSV for patient or partner:  No  Ultrasound Today: Yes    OB History    Para Term  AB Living   6 5 4 1   5   SAB IAB Ectopic Molar Multiple Live Births           0 5      # Outcome Date GA Lbr Antoni/2nd Weight Sex Delivery Anes PTL Lv   6 Current            5 Term 19 39w0d / 00:01 3170 g (6 lb 15.8 oz) M Vag-Spont None N VERONIKA   4  18 35w5d  2070 g (4 lb 9 oz) F Vag-Spont   VERONIKA      Birth Comments: Given up for Adoption, Club Foot, poss GI stricture per previous prenatal record      Complications: Oligohydramnios, IUGR (intrauterine growth restriction) affecting care of mother, Club foot   3 Term 16 40w0d  2807 g (6 lb 3 oz) M Vag-Spont   VERONIKA      Birth Comments: [facility]   2 Term 14 40w0d  3629 g (8 lb) F Vag-Spont   VERONIKA   1 Term 12 40w0d  2892 g (6 lb 6 oz) F Vag-Spont   VERONIKA      Obstetric Comments   G1- FOB 1- [facility]   G2- FOB 1- [facility]   G3- FOB 2- [facility]   G4- Poss Club Foot, GI Stricture, Oligohydramnios, and IUGR- FOB 1- [facility]   G5- FOB 1-    G6- FOB 2- [facility]  "      Additional Pertinent History   Last Pap : 2020- normal per patient  Last Completed Pap Smear       This patient has no relevant Health Maintenance data.          History of abnormal Pap smear:  No  Family history of uterine, colon, breast, or ovarian cancer:  No  Feelings of Anxiety or Depression:  No  Tobacco Usage?: No   Alcohol/Drug Use?: NO  Over the age of 35 at delivery: No  Desires Genetic Screening: None  Flu Status: Declines    PMH    Current Outpatient Medications:     Pediatric Multiple Vitamins (Flinstones Gummies Omega-3 DHA) chewable tablet, Chew 1 tablet Daily., Disp: , Rfl:      History reviewed. No pertinent past medical history.     Past Surgical History:   Procedure Laterality Date    LAPAROSCOPIC CHOLECYSTECTOMY      WISDOM TOOTH EXTRACTION         Review of Systems   Review of Systems    Patient Reports:  Headaches, Vision changes, Heartburn, Nausea, and Vomiting. Patient describes vision changes as \"black dots\". Patient states that this occurred a couple of months ago while she was at work. Patient states that this occurred prior to developing a HA. Patient denies taking Tylenol with HA. Patient states that she just lets the HA \"run its course\". Patient reports vomiting 1-2 times each morning. Patient states that she is vomiting stomach acid each morning.   Patient Denies:  Swelling, Low back pain, Vaginal bleeding, LOF, Etc.  Fetal Movement: Present  All systems reviewed and otherwise normal.    I have reviewed and agree with the HPI, ROS, and historical information as entered above.     /88   Wt (!) 137 kg (301 lb 9.6 oz)   LMP 05/25/2023   BMI 45.71 kg/m²     The additional following portions of the patient's history were reviewed and updated as appropriate: allergies, current medications, past family history, past medical history, past social history, past surgical history, and problem list.    Physical Exam  General:  well developed; well nourished  no acute distress "   Chest/Respiratory: No labored breathing, normal respiratory effort, normal appearance, no respiratory noises noted   Heart:  normal rate, regular rhythm,  no murmurs, rubs, or gallops   Thyroid: normal to inspection and palpation   Breasts:  Not performed.   Abdomen: soft, non-tender; no masses  no umbilical or inguinal hernias are present  no hepato-splenomegaly   Pelvis: Pap today        Assessment and Plan    Problem List Items Addressed This Visit          Cardiac and Vasculature    Chronic hypertension    Relevant Orders    AlP+ALT+AST+Creat+LD+TBili+..       Gravid and     Encounter for supervision of high risk pregnancy with grand multiparity, antepartum     Other Visit Diagnoses       Encounter for  screening    -  Primary    Relevant Orders    Obstetric Panel    HIV-1 / O / 2 Ag / Antibody    Urine Culture - Urine, Urine, Clean Catch    Urinalysis With Microscopic - Urine, Clean Catch    Urine Drug Screen - Urine, Clean Catch    LIQUID-BASED PAP SMEAR WITH HPV GENOTYPING REGARDLESS OF INTERPRETATION (GUSTAVO,COR,MAD)    AlP+ALT+AST+Creat+LD+TBili+..    Morbid obesity with BMI of 45.0-49.9, adult                Pregnancy at 22w0d  Reviewed routine prenatal care with the office and educational materials given  Discussed options for genetic testing including first trimester nuchal translucency screen, genetic disease carrier testing, quadruple screen, and NIPT  CHTN - start labetalol.  Check labs and 24 hour urine. F/U two weeks  Desires sterilization at delivery if c/s or postpartum.  Consent signed.  U/S incomplete.  Repeat at [facility] in 4 weeks.  Return in about 2 weeks (around 2023).      Keturah Kern MD  10/26/2023

## 2023-10-27 LAB
ABO GROUP BLD: NORMAL
ALP SERPL-CCNC: 53 IU/L (ref 44–121)
ALT SERPL-CCNC: 12 IU/L (ref 0–32)
AMPHETAMINES UR QL SCN: NEGATIVE NG/ML
APPEARANCE UR: ABNORMAL
AST SERPL-CCNC: 13 IU/L (ref 0–40)
BACTERIA #/AREA URNS HPF: ABNORMAL /[HPF]
BARBITURATES UR QL SCN: NEGATIVE NG/ML
BASOPHILS # BLD AUTO: 0 X10E3/UL (ref 0–0.2)
BASOPHILS NFR BLD AUTO: 0 %
BENZODIAZ UR QL SCN: NEGATIVE NG/ML
BILIRUB SERPL-MCNC: <0.2 MG/DL (ref 0–1.2)
BILIRUB UR QL STRIP: NEGATIVE
BLD GP AB SCN SERPL QL: NEGATIVE
BZE UR QL SCN: NEGATIVE NG/ML
CANNABINOIDS UR QL SCN: NEGATIVE NG/ML
CASTS URNS QL MICRO: ABNORMAL /LPF
COLOR UR: YELLOW
CREAT SERPL-MCNC: 0.47 MG/DL (ref 0.57–1)
CREAT UR-MCNC: 148.8 MG/DL (ref 20–300)
EGFRCR SERPLBLD CKD-EPI 2021: 132 ML/MIN/1.73
EOSINOPHIL # BLD AUTO: 0 X10E3/UL (ref 0–0.4)
EOSINOPHIL NFR BLD AUTO: 0 %
EPI CELLS #/AREA URNS HPF: ABNORMAL /HPF (ref 0–10)
ERYTHROCYTE [DISTWIDTH] IN BLOOD BY AUTOMATED COUNT: 12.5 % (ref 11.7–15.4)
GLUCOSE UR QL STRIP: NEGATIVE
HBV SURFACE AG SERPL QL IA: NEGATIVE
HCT VFR BLD AUTO: 34.3 % (ref 34–46.6)
HCV IGG SERPL QL IA: NON REACTIVE
HGB BLD-MCNC: 11.3 G/DL (ref 11.1–15.9)
HGB UR QL STRIP: NEGATIVE
HIV 1+2 AB+HIV1 P24 AG SERPL QL IA: NON REACTIVE
IMM GRANULOCYTES # BLD AUTO: 0.1 X10E3/UL (ref 0–0.1)
IMM GRANULOCYTES NFR BLD AUTO: 1 %
KETONES UR QL STRIP: NEGATIVE
LABORATORY COMMENT REPORT: NORMAL
LDH SERPL L TO P-CCNC: 154 IU/L (ref 119–226)
LEUKOCYTE ESTERASE UR QL STRIP: ABNORMAL
LYMPHOCYTES # BLD AUTO: 1.7 X10E3/UL (ref 0.7–3.1)
LYMPHOCYTES NFR BLD AUTO: 17 %
MCH RBC QN AUTO: 31.7 PG (ref 26.6–33)
MCHC RBC AUTO-ENTMCNC: 32.9 G/DL (ref 31.5–35.7)
MCV RBC AUTO: 96 FL (ref 79–97)
METHADONE UR QL SCN: NEGATIVE NG/ML
MICRO URNS: ABNORMAL
MONOCYTES # BLD AUTO: 0.5 X10E3/UL (ref 0.1–0.9)
MONOCYTES NFR BLD AUTO: 5 %
NEUTROPHILS # BLD AUTO: 7.7 X10E3/UL (ref 1.4–7)
NEUTROPHILS NFR BLD AUTO: 77 %
NITRITE UR QL STRIP: NEGATIVE
OPIATES UR QL SCN: NEGATIVE NG/ML
OXYCODONE+OXYMORPHONE UR QL SCN: NEGATIVE NG/ML
PCP UR QL: NEGATIVE NG/ML
PH UR STRIP: 6 [PH] (ref 5–7.5)
PH UR: 6 [PH] (ref 4.5–8.9)
PLATELET # BLD AUTO: 209 X10E3/UL (ref 150–450)
PROPOXYPH UR QL SCN: NEGATIVE NG/ML
PROT UR QL STRIP: ABNORMAL
RBC # BLD AUTO: 3.57 X10E6/UL (ref 3.77–5.28)
RBC #/AREA URNS HPF: ABNORMAL /HPF (ref 0–2)
RH BLD: NEGATIVE
RPR SER QL: NON REACTIVE
RUBV IGG SERPL IA-ACNC: 1.61 INDEX
SP GR UR STRIP: 1.02 (ref 1–1.03)
URATE SERPL-MCNC: 3.4 MG/DL (ref 2.6–6.2)
UROBILINOGEN UR STRIP-MCNC: 1 MG/DL (ref 0.2–1)
WBC # BLD AUTO: 10 X10E3/UL (ref 3.4–10.8)
WBC #/AREA URNS HPF: ABNORMAL /HPF (ref 0–5)

## 2023-10-29 LAB
BACTERIA UR CULT: NORMAL
BACTERIA UR CULT: NORMAL

## 2023-10-30 LAB — REF LAB TEST METHOD: NORMAL

## 2023-10-31 RX ORDER — METRONIDAZOLE 500 MG/1
2000 TABLET ORAL ONCE
Qty: 4 TABLET | Refills: 0 | Status: SHIPPED | OUTPATIENT
Start: 2023-10-31 | End: 2023-11-02

## 2023-11-02 ENCOUNTER — TELEPHONE (OUTPATIENT)
Dept: OBSTETRICS AND GYNECOLOGY | Facility: CLINIC | Age: 28
End: 2023-11-02
Payer: COMMERCIAL

## 2023-11-02 NOTE — TELEPHONE ENCOUNTER
Pt calling to f/u on labs. Notified of positive Trich on pap, rx for Flagyl was already sent in. Reviewed partner needing to be treated and she will need MAYKEL. She already has a KYLE apt for 11/9/2034, so she can do the MAYKEL at a later apt.

## 2023-12-05 ENCOUNTER — ROUTINE PRENATAL (OUTPATIENT)
Dept: OBSTETRICS AND GYNECOLOGY | Facility: CLINIC | Age: 28
End: 2023-12-05
Payer: COMMERCIAL

## 2023-12-05 VITALS — WEIGHT: 293 LBS | SYSTOLIC BLOOD PRESSURE: 126 MMHG | DIASTOLIC BLOOD PRESSURE: 70 MMHG | BODY MASS INDEX: 45.12 KG/M2

## 2023-12-05 DIAGNOSIS — Z11.3 SCREENING EXAMINATION FOR STD (SEXUALLY TRANSMITTED DISEASE): Primary | ICD-10-CM

## 2023-12-05 DIAGNOSIS — Z34.90 PRENATAL CARE, ANTEPARTUM: ICD-10-CM

## 2023-12-05 DIAGNOSIS — O09.40 ENCOUNTER FOR SUPERVISION OF HIGH RISK PREGNANCY WITH GRAND MULTIPARITY, ANTEPARTUM: ICD-10-CM

## 2023-12-05 DIAGNOSIS — O21.9 NAUSEA/VOMITING IN PREGNANCY: ICD-10-CM

## 2023-12-05 DIAGNOSIS — I10 CHRONIC HYPERTENSION: ICD-10-CM

## 2023-12-05 DIAGNOSIS — Z36.2 ENCOUNTER FOR FOLLOW-UP ULTRASOUND OF FETAL ANATOMY: ICD-10-CM

## 2023-12-05 DIAGNOSIS — Z34.90 FUNDAL HEIGHT HIGH FOR DATES: ICD-10-CM

## 2023-12-05 DIAGNOSIS — E66.01 MORBID OBESITY WITH BMI OF 45.0-49.9, ADULT: ICD-10-CM

## 2023-12-05 LAB
GLUCOSE UR STRIP-MCNC: NEGATIVE MG/DL
PROT UR STRIP-MCNC: NEGATIVE MG/DL

## 2023-12-05 RX ORDER — ONDANSETRON 4 MG/1
4 TABLET, ORALLY DISINTEGRATING ORAL EVERY 8 HOURS PRN
Qty: 20 TABLET | Refills: 0 | Status: SHIPPED | OUTPATIENT
Start: 2023-12-05

## 2023-12-05 NOTE — PROGRESS NOTES
OB FOLLOW UP  CC- Here for care of pregnancy        Laura Riley is a 28 y.o.  27w5d patient being seen today for her obstetrical follow up. Patient reports nausea with vomiting every single day in the morning. After she vomits, the nausea resolves and she feels better. RN instructed her to try vit B6 and unisom for nausea and gave her information about safe meds to take during pregnancy.    She needs MAYKEL for trich today. She reports taking meds and no symptoms currently. She reports missing her last two appts due to illness. She needs glucola testing, because it has not been completed yet.       MBT: A-  Rhogam: will be given today.  28 week packet: reviewed with patient , counseled on fetal movement , pediatrician list reviewed, breast pump discussed, and childbirth classes reviewed  TDAP: will need at next visit- clinic out  Flu Status: Declines  Ultrasound Today: No    Her prenatal care is complicated by (and status) :  Chronic HTN. She does not monitor her BP regularly. RN instructed her to start monitoring it regularly.  Patient Active Problem List   Diagnosis     (normal spontaneous vaginal delivery)    Encounter for supervision of high risk pregnancy with grand multiparity, antepartum    Chronic hypertension    Fundal height high for dates    Nausea/vomiting in pregnancy    Morbid obesity with BMI of 45.0-49.9, adult         ROS -   Patient Reports : Nausea and vomiting  Patient Denies: Loss of Fluid, Vaginal Spotting, Vision Changes, and Contractions  Fetal Movement : normal    The additional following portions of the patient's history were reviewed and updated as appropriate: allergies, current medications, past family history, past medical history, past social history, past surgical history, and problem list.    I have reviewed and agree with the HPI, ROS, and historical information as entered above. Marlys Hall, APRN      /70   Wt 135 kg (297 lb 11.2 oz)   LMP 2023    BMI 45.12 kg/m²         EXAM:     Prenatal Vitals  BP: 126/70  Weight: 135 kg (297 lb 11.2 oz)   Fetal Heart Rate: 130      Fundal Height (cm): 31 cm        Urine Glucose Read-only: Negative  Urine Protein Read-only: Negative         Assessment and Plan    Problem List Items Addressed This Visit          Cardiac and Vasculature    Chronic hypertension    Overview     NOB-Start Labetalol 100mg BID  Baseline PEP  24 hour urine (pt did not do)         Relevant Medications    labetalol (NORMODYNE) 100 MG tablet    Other Relevant Orders    [facility]    Preeclampsia Panel       Endocrine and Metabolic    Morbid obesity with BMI of 45.0-49.9, adult    Relevant Orders    [facility]       Gravid and     Encounter for supervision of high risk pregnancy with grand multiparity, antepartum    Relevant Orders    [facility]    Nausea/vomiting in pregnancy    Relevant Medications    ondansetron ODT (ZOFRAN-ODT) 4 MG disintegrating tablet       Other    Fundal height high for dates    Relevant Orders    [facility]     Other Visit Diagnoses       Screening examination for STD (sexually transmitted disease)    -  Primary    Relevant Orders    Trichomonas vaginalis, PCR - , Cervix    Prenatal care, antepartum        Relevant Orders    POC Urinalysis Dipstick (Completed)    Rhogam Immune Globulin Immunization (Completed)    CBC (No Diff)    ABO / Rh    Antibody Screen    Gestational Screen 1 Hr ([facility])    Encounter for follow-up ultrasound of fetal anatomy        Relevant Orders    [facility]            Pregnancy at 27w5d  TDAP next visit, Rhogam today, and glucola will be done on Friday.  Medication(s) Ordered  U/S ordered at follow up-PDC referral   Patient is on Prenatal vitamins  Discussed/encouraged Flu vaccination  Discussed/encouraged TDAP vaccination after 28 weeks  Reviewed Pre-eclampsia  signs/symptoms  Discussed bASA for PIH prevention from 12 to 36wk  Test of cure for trichomonas today. (Green thin discharge)  Signs of labor reviewed such as contractions five minutes apart getting closer together and stronger, decreased fetal movement, vaginal bleeding, or leaking of fluid. Reviewed Pre-eclampsia signs/symptoms-Headache not relieved by tylenol or rest, chest pain, shortness of breath, right upper quadrant pain, blurry vision-go to labor and delivery if office is closed or call office if open, patient verbalized understanding.   CBC, PEP, 1 Hour glucola, and ABO ordered for Friday.  11. Follow up ultrasound with PDC  12. Follow up after PDC here. (Attempted to call patient x3 times-sent note to front office to follow up)    Marlys Hall, APRN  12/05/2023

## 2023-12-06 LAB — T VAGINALIS RRNA SPEC QL NAA+PROBE: POSITIVE

## 2023-12-08 DIAGNOSIS — A59.9 TRICHOMONAS INFECTION: Primary | ICD-10-CM

## 2023-12-08 RX ORDER — METRONIDAZOLE 500 MG/1
500 TABLET ORAL 2 TIMES DAILY
Qty: 14 TABLET | Refills: 0 | Status: SHIPPED | OUTPATIENT
Start: 2023-12-08 | End: 2023-12-18

## 2023-12-12 PROBLEM — O26.893 RH NEGATIVE STATUS DURING PREGNANCY IN THIRD TRIMESTER: Status: ACTIVE | Noted: 2023-12-12

## 2023-12-12 PROBLEM — Z67.91 RH NEGATIVE STATUS DURING PREGNANCY IN THIRD TRIMESTER: Status: ACTIVE | Noted: 2023-12-12

## 2023-12-20 ENCOUNTER — OFFICE VISIT (OUTPATIENT)
Dept: OBSTETRICS AND GYNECOLOGY | Facility: HOSPITAL | Age: 28
End: 2023-12-20
Payer: COMMERCIAL

## 2023-12-20 ENCOUNTER — HOSPITAL ENCOUNTER (OUTPATIENT)
Dept: WOMENS IMAGING | Facility: HOSPITAL | Age: 28
Discharge: HOME | End: 2023-12-20
Payer: COMMERCIAL

## 2023-12-20 VITALS
HEIGHT: 68 IN | WEIGHT: 293 LBS | BODY MASS INDEX: 44.41 KG/M2 | SYSTOLIC BLOOD PRESSURE: 120 MMHG | DIASTOLIC BLOOD PRESSURE: 56 MMHG

## 2023-12-20 DIAGNOSIS — I10 CHRONIC HYPERTENSION: ICD-10-CM

## 2023-12-20 DIAGNOSIS — Z34.90 FUNDAL HEIGHT HIGH FOR DATES: ICD-10-CM

## 2023-12-20 DIAGNOSIS — I10 CHRONIC HYPERTENSION: Primary | ICD-10-CM

## 2023-12-20 DIAGNOSIS — E66.01 MORBID OBESITY WITH BMI OF 45.0-49.9, ADULT: ICD-10-CM

## 2023-12-20 DIAGNOSIS — O09.40 ENCOUNTER FOR SUPERVISION OF HIGH RISK PREGNANCY WITH GRAND MULTIPARITY, ANTEPARTUM: ICD-10-CM

## 2023-12-20 DIAGNOSIS — Z36.2 ENCOUNTER FOR FOLLOW-UP ULTRASOUND OF FETAL ANATOMY: ICD-10-CM

## 2023-12-20 PROCEDURE — 76811 OB US DETAILED SNGL FETUS: CPT

## 2023-12-20 NOTE — ASSESSMENT & PLAN NOTE
Concern for chronic hypertension based on elevated blood pressures at new OB visit at 22 weeks and elevated blood pressure outside of pregnancy with primary care doctor.  Patient is currently on labetalol 100 mg twice daily and blood pressure today is 120/56.  Patient had baseline serum preeclampsia evaluation which was normal but never completed 24-hour urine protein.  Today's ultrasound shows normal growth and overall normal anatomy the limited cardiac evaluation secondary to fetal positioning and maternal body habitus.  Plan for repeat growth ultrasound in 4 weeks.  Careful preeclampsia return precautions were reviewed.

## 2023-12-20 NOTE — PROGRESS NOTES
Pt reports she had never been told she had high bp until her visit with [facility] and was placed on BP meds that day 11/2023, Pt denies ha, blurry vision and epigastric pain. Next f/u with [facility] office is tomorrow, JOHNNY herrmann

## 2023-12-20 NOTE — LETTER
2023       No Recipients    Patient: Laura Riley   YOB: 1995   Date of Visit: 2023       Dear LYNN Delacruz,    Thank you for referring Laura Riley to me for evaluation. Below is a copy of my consult note.    If you have questions, please do not hesitate to call me. I look forward to following Laura along with you.         Sincerely,        Guille Jensen MD        CC:   No Recipients    Pt reports she had never been told she had high bp until her visit with [facility] and was placed on BP meds that day 2023, Pt denies ha, blurry vision and epigastric pain. Next f/u with [facility] office is tomorrow, NIPT declined         Maternal/Fetal Medicine Consult Note   Date: 2023  Name: Laura Riley    : 1995     MRN: [id number]     Referring Provider: LYNN Delacruz    Chief Complaint  GHTN, SO, Hx of PTD     Subjective     History of Present Illness:  Laura Riley is a 28 y.o.  29w6d who presents today for consult secondary to chronic hypertension versus gestational hypertension    KAREEM: Estimated Date of Delivery: 24     ROS:   Otherwise Noted in HPI    Past Medical History:   Diagnosis Date   • Gestational hypertension 2023    placed on labetalol bid      Past Surgical History:   Procedure Laterality Date   • LAPAROSCOPIC CHOLECYSTECTOMY     • WISDOM TOOTH EXTRACTION        OB History          6    Para   5    Term   4       1    AB   0    Living   5         SAB   0    IAB   0    Ectopic   0    Molar   0    Multiple   0    Live Births   5          Obstetric Comments   G1- FOB 1- [facility]  G2- FOB 1- [facility]  [facility]- [place] [place]  G4- Poss Club Foot, GI Stricture, Oligohydramnios, and IUGR- FOB 1- - given up for adoption   G5- FOB 1- [facility]  G6- FOB 2- [facility]    Fob #1 - Pregnancy #1 and #2  Fob #2 - Pregnancy #3-#6                Current Outpatient Medications:   •  labetalol (NORMODYNE) 100 MG tablet, Take 1 tablet by mouth 2 (Two) Times a  "Day., Disp: 60 tablet, Rfl: 3  •  ondansetron ODT (ZOFRAN-ODT) 4 MG disintegrating tablet, Place 1 tablet on the tongue Every 8 (Eight) Hours As Needed for Nausea or Vomiting., Disp: 20 tablet, Rfl: 0  •  Pediatric Multiple Vitamins (Flinstones Gummies Omega-3 DHA) chewable tablet, Chew 1 tablet Daily., Disp: , Rfl:     Objective     Vital Signs  /56   Ht 171.5 cm (67.5\")   Wt 134 kg (294 lb 9.6 oz)   LMP 2023   Estimated body mass index is 45.46 kg/m² as calculated from the following:    Height as of this encounter: 171.5 cm (67.5\").    Weight as of this encounter: 134 kg (294 lb 9.6 oz).    Ultrasound Impression:   See Viewpoint     Assessment and Plan     Laura Riley is a 28 y.o.  29w6d who presents today for consult secondary to gestational hypertension versus chronic hypertension    Diagnoses and all orders for this visit:    1. Chronic hypertension (Primary)  Assessment & Plan:  Concern for chronic hypertension based on elevated blood pressures at new OB visit at 22 weeks and elevated blood pressure outside of pregnancy with primary care doctor.  Patient is currently on labetalol 100 mg twice daily and blood pressure today is 120/56.  Patient had baseline serum preeclampsia evaluation which was normal but never completed 24-hour urine protein.  Today's ultrasound shows normal growth and overall normal anatomy the limited cardiac evaluation secondary to fetal positioning and maternal body habitus.  Plan for repeat growth ultrasound in 4 weeks.  Careful preeclampsia return precautions were reviewed.           Follow Up  Follow-up in 4 weeks    I spent 15 minutes caring for the patient on the day of service. This included: obtaining or reviewing a separately obtained medical history, reviewing patient records, performing a medically appropriate exam and/or evaluation, counseling or educating the patient/family/caregiver, ordering medications, labs, and/or procedures and documenting such " in the medical record. This does not include time spent on review and interpretation of other tests such as fetal ultrasound or the performance of other procedures such as amniocentesis or CVS.      Guille Jensen MD, FACOG  Maternal Fetal Medicine, [facility]

## 2023-12-20 NOTE — PROGRESS NOTES
"    Maternal/Fetal Medicine Consult Note   Date: 2023  Name: Laura Riley    : 1995     MRN: [id number]     Referring Provider: LYNN Delacruz    Chief Complaint  GHTN, SO, Hx of PTD     Subjective     History of Present Illness:  Laura Riley is a 28 y.o.  29w6d who presents today for consult secondary to chronic hypertension versus gestational hypertension    KAREEM: Estimated Date of Delivery: 24     ROS:   Otherwise Noted in HPI    Past Medical History:   Diagnosis Date    Gestational hypertension 2023    placed on labetalol bid      Past Surgical History:   Procedure Laterality Date    LAPAROSCOPIC CHOLECYSTECTOMY      WISDOM TOOTH EXTRACTION        OB History          6    Para   5    Term   4       1    AB   0    Living   5         SAB   0    IAB   0    Ectopic   0    Molar   0    Multiple   0    Live Births   5          Obstetric Comments   G1- FOB 1- [facility]  G2- FOB 1- [facility]  [facility]- FOB 2- [facility]  G4- Poss Club Foot, GI Stricture, Oligohydramnios, and IUGR- FOB 1- [facility]- given up for adoption   G5- FOB 1- [facility]  G6- FOB 2- [facility]    Fob #1 - Pregnancy #1 and #2  Fob #2 - Pregnancy #3-#6                Current Outpatient Medications:     labetalol (NORMODYNE) 100 MG tablet, Take 1 tablet by mouth 2 (Two) Times a Day., Disp: 60 tablet, Rfl: 3    ondansetron ODT (ZOFRAN-ODT) 4 MG disintegrating tablet, Place 1 tablet on the tongue Every 8 (Eight) Hours As Needed for Nausea or Vomiting., Disp: 20 tablet, Rfl: 0    Pediatric Multiple Vitamins (Flinstones Gummies Omega-3 DHA) chewable tablet, Chew 1 tablet Daily., Disp: , Rfl:     Objective     Vital Signs  /56   Ht 171.5 cm (67.5\")   Wt 134 kg (294 lb 9.6 oz)   LMP 2023   Estimated body mass index is 45.46 kg/m² as calculated from the following:    Height as of this encounter: 171.5 cm (67.5\").    Weight as of this encounter: 134 kg (294 lb 9.6 oz).    Ultrasound Impression:   See Viewpoint     Assessment and Plan "     Laura Riley is a 28 y.o.  29w6d who presents today for consult secondary to gestational hypertension versus chronic hypertension    Diagnoses and all orders for this visit:    1. Chronic hypertension (Primary)  Assessment & Plan:  Concern for chronic hypertension based on elevated blood pressures at new OB visit at 22 weeks and elevated blood pressure outside of pregnancy with primary care doctor.  Patient is currently on labetalol 100 mg twice daily and blood pressure today is 120/56.  Patient had baseline serum preeclampsia evaluation which was normal but never completed 24-hour urine protein.  Today's ultrasound shows normal growth and overall normal anatomy the limited cardiac evaluation secondary to fetal positioning and maternal body habitus.  Plan for repeat growth ultrasound in 4 weeks.  Careful preeclampsia return precautions were reviewed.           Follow Up  Follow-up in 4 weeks    I spent 15 minutes caring for the patient on the day of service. This included: obtaining or reviewing a separately obtained medical history, reviewing patient records, performing a medically appropriate exam and/or evaluation, counseling or educating the patient/family/caregiver, ordering medications, labs, and/or procedures and documenting such in the medical record. This does not include time spent on review and interpretation of other tests such as fetal ultrasound or the performance of other procedures such as amniocentesis or CVS.      Guille Jensen MD, FACOG  Maternal Fetal Medicine, [facility]

## 2023-12-21 ENCOUNTER — ROUTINE PRENATAL (OUTPATIENT)
Dept: OBSTETRICS AND GYNECOLOGY | Facility: CLINIC | Age: 28
End: 2023-12-21
Payer: COMMERCIAL

## 2023-12-21 VITALS — DIASTOLIC BLOOD PRESSURE: 70 MMHG | WEIGHT: 293 LBS | SYSTOLIC BLOOD PRESSURE: 120 MMHG | BODY MASS INDEX: 45.46 KG/M2

## 2023-12-21 DIAGNOSIS — Z34.93 PRENATAL CARE IN THIRD TRIMESTER: Primary | ICD-10-CM

## 2023-12-21 DIAGNOSIS — I10 CHRONIC HYPERTENSION: ICD-10-CM

## 2023-12-21 DIAGNOSIS — O26.893 RH NEGATIVE STATUS DURING PREGNANCY IN THIRD TRIMESTER: ICD-10-CM

## 2023-12-21 DIAGNOSIS — O09.40 ENCOUNTER FOR SUPERVISION OF HIGH RISK PREGNANCY WITH GRAND MULTIPARITY, ANTEPARTUM: ICD-10-CM

## 2023-12-21 DIAGNOSIS — Z67.91 RH NEGATIVE STATUS DURING PREGNANCY IN THIRD TRIMESTER: ICD-10-CM

## 2023-12-21 NOTE — PROGRESS NOTES
OB FOLLOW UP  CC- Here for care of pregnancy        Laura Riley is a 28 y.o.  30w0d patient being seen today for her obstetrical follow up visit. Patient reports no complaints. Unable to void at this time.    Her prenatal care is complicated by (and status) :    Patient Active Problem List   Diagnosis     (normal spontaneous vaginal delivery)    Encounter for supervision of high risk pregnancy with grand multiparity, antepartum    Chronic hypertension    Fundal height high for dates    Nausea/vomiting in pregnancy    Morbid obesity with BMI of 45.0-49.9, adult    Rh negative status during pregnancy in third trimester       Flu Status: Declines  TDAP status: declines  Rhogam status: was given  28 week labs: not completed yet  Ultrasound Today: No  Non Stress Test: No.      ROS -   Patient Reports : see above  Patient Denies: Loss of Fluid, Vaginal Spotting, Vision Changes, Headaches, Nausea , Vomiting , Contractions, and Epigastric pain  Fetal Movement : normal  All other systems reviewed and are negative.       The additional following portions of the patient's history were reviewed and updated as appropriate: allergies, current medications, past family history, past medical history, past social history, past surgical history, and problem list.    I have reviewed and agree with the HPI, ROS, and historical information as entered above. Gregory Correa, APRN      /70   Wt 134 kg (294 lb 9.6 oz)   LMP 2023   BMI 45.46 kg/m²       EXAM:     Prenatal Vitals  BP: 120/70  Weight: 134 kg (294 lb 9.6 oz)   Fetal Heart Rate: 148         Assessment and Plan    Problem List Items Addressed This Visit       Encounter for supervision of high risk pregnancy with grand multiparity, antepartum    Chronic hypertension    Overview     NOB-Start Labetalol 100mg BID  Baseline PEP  24 hour urine (pt did not do)         Relevant Medications    labetalol (NORMODYNE) 100 MG tablet    Rh negative  status during pregnancy in third trimester    Overview     12/5/23          Other Visit Diagnoses       Prenatal care in third trimester    -  Primary    Relevant Orders    POC Urinalysis Dipstick            Pregnancy at 30w0d  CHTN: pt reports feeling much better on the labetalol.  Pt unable to do glucose testing this morning because she needs to get to work. Will come back this afternoon. Also unable to void and will try this afternoon.  Fetal status reassuring. [facility] scan yesterday reassuring. Breech presentation. Will repeat at [facility] in 4 weeks  28 week labs reviewed.    Activity and Exercise discussed.  Fetal movement/PTL or Labor precautions  Patient is on Prenatal vitamins  Reviewed Pre-eclampsia signs/symptoms  Return in about 4 weeks (around 1/18/2024) for WAYNE Correa, APRN  12/21/2023

## 2024-01-10 ENCOUNTER — TELEPHONE (OUTPATIENT)
Dept: OBSTETRICS AND GYNECOLOGY | Facility: CLINIC | Age: 29
End: 2024-01-10
Payer: COMMERCIAL

## 2024-01-10 NOTE — TELEPHONE ENCOUNTER
Attempted to reach patient about CBC, PEP, and 1 hour glucose screen that were ordered but have not been collected. Left voice message requesting she call us back.

## 2024-01-23 ENCOUNTER — ROUTINE PRENATAL (OUTPATIENT)
Dept: OBSTETRICS AND GYNECOLOGY | Facility: CLINIC | Age: 29
End: 2024-01-23
Payer: COMMERCIAL

## 2024-01-23 ENCOUNTER — HOSPITAL ENCOUNTER (OUTPATIENT)
Dept: WOMENS IMAGING | Facility: HOSPITAL | Age: 29
Discharge: HOME | End: 2024-01-23
Admitting: OBSTETRICS & GYNECOLOGY
Payer: COMMERCIAL

## 2024-01-23 ENCOUNTER — OFFICE VISIT (OUTPATIENT)
Dept: OBSTETRICS AND GYNECOLOGY | Facility: HOSPITAL | Age: 29
End: 2024-01-23
Payer: COMMERCIAL

## 2024-01-23 ENCOUNTER — TELEPHONE (OUTPATIENT)
Dept: OBSTETRICS AND GYNECOLOGY | Facility: CLINIC | Age: 29
End: 2024-01-23

## 2024-01-23 VITALS — DIASTOLIC BLOOD PRESSURE: 76 MMHG | WEIGHT: 293 LBS | SYSTOLIC BLOOD PRESSURE: 122 MMHG | BODY MASS INDEX: 45.34 KG/M2

## 2024-01-23 VITALS — DIASTOLIC BLOOD PRESSURE: 74 MMHG | BODY MASS INDEX: 45.06 KG/M2 | SYSTOLIC BLOOD PRESSURE: 137 MMHG | WEIGHT: 292 LBS

## 2024-01-23 DIAGNOSIS — Z67.91 RH NEGATIVE STATUS DURING PREGNANCY IN THIRD TRIMESTER: ICD-10-CM

## 2024-01-23 DIAGNOSIS — O09.40 ENCOUNTER FOR SUPERVISION OF HIGH RISK PREGNANCY WITH GRAND MULTIPARITY, ANTEPARTUM: ICD-10-CM

## 2024-01-23 DIAGNOSIS — E66.01 MORBID OBESITY WITH BMI OF 45.0-49.9, ADULT: ICD-10-CM

## 2024-01-23 DIAGNOSIS — I10 CHRONIC HYPERTENSION: Primary | ICD-10-CM

## 2024-01-23 DIAGNOSIS — I10 CHRONIC HYPERTENSION: ICD-10-CM

## 2024-01-23 DIAGNOSIS — O26.893 RH NEGATIVE STATUS DURING PREGNANCY IN THIRD TRIMESTER: ICD-10-CM

## 2024-01-23 DIAGNOSIS — Z34.93 THIRD TRIMESTER PREGNANCY: Primary | ICD-10-CM

## 2024-01-23 DIAGNOSIS — O21.9 NAUSEA/VOMITING IN PREGNANCY: ICD-10-CM

## 2024-01-23 LAB
GLUCOSE UR STRIP-MCNC: NEGATIVE MG/DL
PROT UR STRIP-MCNC: NEGATIVE MG/DL

## 2024-01-23 PROCEDURE — 99214 OFFICE O/P EST MOD 30 MIN: CPT | Performed by: NURSE PRACTITIONER

## 2024-01-23 PROCEDURE — 76819 FETAL BIOPHYS PROFIL W/O NST: CPT

## 2024-01-23 PROCEDURE — 76816 OB US FOLLOW-UP PER FETUS: CPT

## 2024-01-23 RX ORDER — ONDANSETRON 4 MG/1
4 TABLET, ORALLY DISINTEGRATING ORAL EVERY 8 HOURS PRN
Qty: 30 TABLET | Refills: 1 | Status: SHIPPED | OUTPATIENT
Start: 2024-01-23

## 2024-01-23 NOTE — PROGRESS NOTES
OB FOLLOW UP  CC- Here for care of pregnancy        Laura Riley is a 29 y.o.  34w5d patient being seen today for her obstetrical follow up visit. Patient reports nausea and vomiting once a day.     Her prenatal care is complicated by (and status) :   Patient Active Problem List   Diagnosis     (normal spontaneous vaginal delivery)    Encounter for supervision of high risk pregnancy with grand multiparity, antepartum    Chronic hypertension    Fundal height high for dates    Nausea/vomiting in pregnancy    Morbid obesity with BMI of 45.0-49.9, adult    Rh negative status during pregnancy in third trimester       Flu Status: Declines  Ultrasound Today: No  Non Stress Test: No.    ROS -   Patient Reports : Nausea and Vomiting. She reports vomiting 1 times per day  Patient Denies: Loss of Fluid, Vaginal Spotting, Vision Changes, Headaches, Contractions, and Epigastric pain  Fetal Movement : normal  All other systems reviewed and are negative.       The additional following portions of the patient's history were reviewed and updated as appropriate: allergies and current medications.    I have reviewed and agree with the HPI, ROS, and historical information as entered above. Gregory Correa, APRN      /76   Wt 133 kg (293 lb 12.8 oz)   LMP 2023   BMI 45.34 kg/m²       EXAM:     Prenatal Vitals  BP: 122/76  Weight: 133 kg (293 lb 12.8 oz)   Fetal Heart Rate: PDC               Urine Glucose Read-only: Negative  Urine Protein Read-only: Negative           Assessment and Plan    Problem List Items Addressed This Visit       Encounter for supervision of high risk pregnancy with grand multiparity, antepartum    Chronic hypertension    Overview     NOB-Start Labetalol 100mg BID  Baseline PEP  24 hour urine (pt did not do)         Relevant Medications    labetalol (NORMODYNE) 100 MG tablet    Other Relevant Orders    AlP+ALT+AST+Creat+LD+TBili+..    Morbid obesity with BMI of 45.0-49.9,  adult    Rh negative status during pregnancy in third trimester    Overview     12/5/23          Other Visit Diagnoses       Third trimester pregnancy    -  Primary    Relevant Orders    POC Urinalysis Dipstick (Completed)    Gestational Screen 1 Hr ([facility])    Antibody Screen    CBC (No Diff)    AlP+ALT+AST+Creat+LD+TBili+..            Pregnancy at 34w5d  Glucola testing being done today.  Fetal status reassuring. PDC report pending. Pt was told everything look great with baby and she does not need to be seen back with them prior to delivery.  Activity and Exercise discussed.  Fetal movement/PTL or Labor precautions  Lab(s) Ordered  GBS next visit  Delivery options reviewed with pt. She would like spontaneous labor.  Return in about 2 weeks (around 2/6/2024).    Gregory Correa, APRN  01/23/2024

## 2024-01-23 NOTE — TELEPHONE ENCOUNTER
Pt calling from [facility] regarding Zofran prescription as pharmacy has indicated a prescription has not been received.

## 2024-01-23 NOTE — LETTER
"2024       No Recipients    Patient: Laura Riley   YOB: 1995   Date of Visit: 2024       Dear LYNN Delacruz,    Thank you for referring Laura Riley to me for evaluation. Below is a copy of my consult note.    If you have questions, please do not hesitate to call me. I look forward to following Laura along with you.         Sincerely,        Aminta Evans MD        CC:   No Recipients    Pt has appt with Dr. Owens's office today. NIPT declined. Pt denies vaginal bleeding, contractions, or loss of fluid.        Maternal/Fetal Medicine Follow Up Note     Name: Laura Riley    : 1995     MRN: [id number]     Referring Provider: LYNN Delacruz    Chief Complaint  CHTN    Subjective     History of Present Illness:  Laura Riley is a 29 y.o.  35w3d who presents today for follow up   No interval issues   Taking Labetalol     KAREEM: Estimated Date of Delivery: 24     ROS:   As noted in HPI.     Objective     Vital Signs  /74   Wt 132 kg (292 lb)   LMP 2023   Estimated body mass index is 45.06 kg/m² as calculated from the following:    Height as of 23: 171.5 cm (67.5\").    Weight as of this encounter: 132 kg (292 lb).    Ultrasound Impression:   See viewpoint      Assessment and Plan     Laura Riley is a 29 y.o.  35w3d     Diagnoses and all orders for this visit:    1. Chronic hypertension (Primary)  Assessment & Plan:  BP well controlled on current medication regimen   Recommend  testing in your office with planned delivery at 37 - 39 weeks GA based on BP control and fetal testing   Further scans with MFM at your discretion       2. Morbid obesity with BMI of 45.0-49.9, adult         Follow Up  No follow-ups on file.    I spent 20 minutes caring for the patient on the day of service. This included: obtaining or reviewing a separately obtained medical history, reviewing patient records, performing a " medically appropriate exam and/or evaluation, counseling or educating the patient/family/caregiver, ordering medications, labs, and/or procedures and documenting such in the medical record. This does not include time spent on review and interpretation of other tests such as fetal ultrasound or the performance of other procedures such as amniocentesis or CVS.    Aminta Evans MD FACOG  Maternal Fetal Medicine, [facility]     2024

## 2024-01-23 NOTE — TELEPHONE ENCOUNTER
Returned patient's call. Informed her that the Rx for Zofran has now been sent to her pharmacy. She v/u.

## 2024-01-23 NOTE — PROGRESS NOTES
Pt has appt with Dr. Owens's office today. NIPT declined. Pt denies vaginal bleeding, contractions, or loss of fluid.

## 2024-01-24 LAB
ALP SERPL-CCNC: 95 U/L (ref 39–117)
ALT SERPL-CCNC: 22 U/L (ref 1–33)
AST SERPL-CCNC: 15 U/L (ref 1–32)
BASOPHILS # BLD AUTO: 0.02 10*3/MM3 (ref 0–0.2)
BASOPHILS NFR BLD AUTO: 0.2 % (ref 0–1.5)
BILIRUB SERPL-MCNC: 0.3 MG/DL (ref 0–1.2)
BLD GP AB SCN SERPL QL: NEGATIVE
CREAT SERPL-MCNC: 0.64 MG/DL (ref 0.57–1)
EGFRCR SERPLBLD CKD-EPI 2021: 122.1 ML/MIN/1.73
EOSINOPHIL # BLD AUTO: 0.01 10*3/MM3 (ref 0–0.4)
EOSINOPHIL NFR BLD AUTO: 0.1 % (ref 0.3–6.2)
ERYTHROCYTE [DISTWIDTH] IN BLOOD BY AUTOMATED COUNT: 12.8 % (ref 12.3–15.4)
GLUCOSE 1H P 50 G GLC PO SERPL-MCNC: 92 MG/DL (ref 65–139)
HCT VFR BLD AUTO: 33.1 % (ref 34–46.6)
HGB BLD-MCNC: 11.2 G/DL (ref 12–15.9)
IMM GRANULOCYTES # BLD AUTO: 0.05 10*3/MM3 (ref 0–0.05)
IMM GRANULOCYTES NFR BLD AUTO: 0.5 % (ref 0–0.5)
LDH SERPL L TO P-CCNC: 177 U/L (ref 135–214)
LYMPHOCYTES # BLD AUTO: 1.46 10*3/MM3 (ref 0.7–3.1)
LYMPHOCYTES NFR BLD AUTO: 16 % (ref 19.6–45.3)
MCH RBC QN AUTO: 32.2 PG (ref 26.6–33)
MCHC RBC AUTO-ENTMCNC: 33.8 G/DL (ref 31.5–35.7)
MCV RBC AUTO: 95.1 FL (ref 79–97)
MONOCYTES # BLD AUTO: 0.5 10*3/MM3 (ref 0.1–0.9)
MONOCYTES NFR BLD AUTO: 5.5 % (ref 5–12)
NEUTROPHILS # BLD AUTO: 7.11 10*3/MM3 (ref 1.7–7)
NEUTROPHILS NFR BLD AUTO: 77.7 % (ref 42.7–76)
NRBC BLD AUTO-RTO: 0 /100 WBC (ref 0–0.2)
PLATELET # BLD AUTO: 237 10*3/MM3 (ref 140–450)
RBC # BLD AUTO: 3.48 10*6/MM3 (ref 3.77–5.28)
URATE SERPL-MCNC: 3.8 MG/DL (ref 2.4–5.7)
WBC # BLD AUTO: 9.15 10*3/MM3 (ref 3.4–10.8)

## 2024-01-28 NOTE — ASSESSMENT & PLAN NOTE
BP well controlled on current medication regimen   Recommend  testing in your office with planned delivery at 37 - 39 weeks GA based on BP control and fetal testing   Further scans with MFM at your discretion

## 2024-01-28 NOTE — PROGRESS NOTES
"    Maternal/Fetal Medicine Follow Up Note     Name: Laura Riley    : 1995     MRN: [id number]     Referring Provider: LYNN Delacruz    Chief Complaint  CHTN    Subjective     History of Present Illness:  Laura Riley is a 29 y.o.  35w3d who presents today for follow up   No interval issues   Taking Labetalol     KAREEM: Estimated Date of Delivery: 24     ROS:   As noted in HPI.     Objective     Vital Signs  /74   Wt 132 kg (292 lb)   LMP 2023   Estimated body mass index is 45.06 kg/m² as calculated from the following:    Height as of 23: 171.5 cm (67.5\").    Weight as of this encounter: 132 kg (292 lb).    Ultrasound Impression:   See viewpoint      Assessment and Plan     Laura Riley is a 29 y.o.  35w3d     Diagnoses and all orders for this visit:    1. Chronic hypertension (Primary)  Assessment & Plan:  BP well controlled on current medication regimen   Recommend  testing in your office with planned delivery at 37 - 39 weeks GA based on BP control and fetal testing   Further scans with MFM at your discretion       2. Morbid obesity with BMI of 45.0-49.9, adult         Follow Up  No follow-ups on file.    I spent 20 minutes caring for the patient on the day of service. This included: obtaining or reviewing a separately obtained medical history, reviewing patient records, performing a medically appropriate exam and/or evaluation, counseling or educating the patient/family/caregiver, ordering medications, labs, and/or procedures and documenting such in the medical record. This does not include time spent on review and interpretation of other tests such as fetal ultrasound or the performance of other procedures such as amniocentesis or CVS.    Aminta Evans MD FACOG  Maternal Fetal Medicine, [facility]     2024  "

## 2024-02-08 ENCOUNTER — ROUTINE PRENATAL (OUTPATIENT)
Dept: OBSTETRICS AND GYNECOLOGY | Facility: CLINIC | Age: 29
End: 2024-02-08
Payer: COMMERCIAL

## 2024-02-08 ENCOUNTER — TELEPHONE (OUTPATIENT)
Dept: OBSTETRICS AND GYNECOLOGY | Facility: CLINIC | Age: 29
End: 2024-02-08

## 2024-02-08 ENCOUNTER — LAB (OUTPATIENT)
Dept: LAB | Facility: HOSPITAL | Age: 29
End: 2024-02-08
Payer: COMMERCIAL

## 2024-02-08 VITALS — BODY MASS INDEX: 45.21 KG/M2 | SYSTOLIC BLOOD PRESSURE: 126 MMHG | WEIGHT: 293 LBS | DIASTOLIC BLOOD PRESSURE: 78 MMHG

## 2024-02-08 DIAGNOSIS — I10 CHRONIC HYPERTENSION: ICD-10-CM

## 2024-02-08 DIAGNOSIS — E66.01 MORBID OBESITY WITH BMI OF 45.0-49.9, ADULT: ICD-10-CM

## 2024-02-08 DIAGNOSIS — O09.40 ENCOUNTER FOR SUPERVISION OF HIGH RISK PREGNANCY WITH GRAND MULTIPARITY, ANTEPARTUM: ICD-10-CM

## 2024-02-08 DIAGNOSIS — Z34.83 PRENATAL CARE, SUBSEQUENT PREGNANCY, THIRD TRIMESTER: Primary | ICD-10-CM

## 2024-02-08 DIAGNOSIS — Z34.83 PRENATAL CARE, SUBSEQUENT PREGNANCY, THIRD TRIMESTER: ICD-10-CM

## 2024-02-08 LAB
GLUCOSE UR STRIP-MCNC: NEGATIVE MG/DL
PROT UR STRIP-MCNC: ABNORMAL MG/DL

## 2024-02-08 PROCEDURE — 87081 CULTURE SCREEN ONLY: CPT

## 2024-02-08 NOTE — TELEPHONE ENCOUNTER
she is CHTN and needs biweekly NST. , per shay give her call to see if she can come in on 02/09 to see her, call went straight to juve singh notifying to call me back and direct line given

## 2024-02-08 NOTE — PROGRESS NOTES
"    OB FOLLOW UP  CC- Here for care of pregnancy        Laura Riley is a 29 y.o.  37w0d patient being seen today for her obstetrical follow up visit. Patient reports no complaints. GBS to be done today.     Her prenatal care is complicated by (and status) :   Patient Active Problem List   Diagnosis     (normal spontaneous vaginal delivery)    Encounter for supervision of high risk pregnancy with grand multiparity, antepartum    Chronic hypertension    Fundal height high for dates    Nausea/vomiting in pregnancy    Morbid obesity with BMI of 45.0-49.9, adult    Rh negative status during pregnancy in third trimester       GBS Status: No results found for: \"STREPGPB\"      Allergies   Allergen Reactions    Bactrim [Sulfamethoxazole-Trimethoprim] Hives            Her Delivery Plan is: Does not desire IOL    US today: no  Non Stress Test: No.    ROS -   Patient Denies: Loss of Fluid, Vaginal Spotting, Vision Changes, Headaches, and Contractions  Fetal Movement : normal  All other systems reviewed and are negative.       The additional following portions of the patient's history were reviewed and updated as appropriate: allergies and current medications.    I have reviewed and agree with the HPI, ROS, and historical information as entered above. Keturah Kern MD        EXAM:     Prenatal Vitals  BP: 126/78  Weight: 133 kg (293 lb)   Fetal Heart Rate: pos              Urine Glucose Read-only: Negative  Urine Protein Read-only: (!) Trace           Assessment and Plan    Problem List Items Addressed This Visit          Cardiac and Vasculature    Chronic hypertension    Overview     NOB-Start Labetalol 100mg BID  Baseline PEP  24 hour urine (pt did not do)         Relevant Medications    labetalol (NORMODYNE) 100 MG tablet       Endocrine and Metabolic    Morbid obesity with BMI of 45.0-49.9, adult       Gravid and     Encounter for supervision of high risk pregnancy with grand multiparity, antepartum "     Other Visit Diagnoses       Prenatal care, subsequent pregnancy, third trimester    -  Primary    Relevant Orders    POC Urinalysis Dipstick (Completed)    Group B Streptococcus Culture - Swab, Vaginal/Rectum            Pregnancy at 37w0d  Fetal status reassuring.   Reviewed Pre-eclampsia signs/symptoms  CHTN on meds - Called patient back to schedule biweekly NST.  Needs to consider induction by 39 weeks.  Will be returning tomorrow for NST due to time constraints today.  Delivery options reviewed with patient  Signs of labor reviewed  Kick counts reviewed  Activity and Exercise discussed.  Return in about 3 days (around 2/11/2024), or needs to start biweekly NST.    Keturah Kern MD  02/08/2024

## 2024-02-09 ENCOUNTER — TELEPHONE (OUTPATIENT)
Dept: OBSTETRICS AND GYNECOLOGY | Facility: CLINIC | Age: 29
End: 2024-02-09

## 2024-02-11 LAB — BACTERIA SPEC AEROBE CULT: NORMAL

## 2024-02-13 ENCOUNTER — ROUTINE PRENATAL (OUTPATIENT)
Dept: OBSTETRICS AND GYNECOLOGY | Facility: CLINIC | Age: 29
End: 2024-02-13
Payer: COMMERCIAL

## 2024-02-13 VITALS — DIASTOLIC BLOOD PRESSURE: 72 MMHG | BODY MASS INDEX: 45.06 KG/M2 | SYSTOLIC BLOOD PRESSURE: 132 MMHG | WEIGHT: 292 LBS

## 2024-02-13 DIAGNOSIS — O23.593 TRICHOMONAL VAGINITIS DURING PREGNANCY IN THIRD TRIMESTER: Primary | ICD-10-CM

## 2024-02-13 DIAGNOSIS — Z11.3 SCREENING FOR STD (SEXUALLY TRANSMITTED DISEASE): ICD-10-CM

## 2024-02-13 DIAGNOSIS — A59.01 TRICHOMONAL VAGINITIS DURING PREGNANCY IN THIRD TRIMESTER: Primary | ICD-10-CM

## 2024-02-13 DIAGNOSIS — Z34.90 PREGNANCY WITH ADOPTION PLANNED, ANTEPARTUM: ICD-10-CM

## 2024-02-13 DIAGNOSIS — Z67.91 RH NEGATIVE STATUS DURING PREGNANCY IN THIRD TRIMESTER: ICD-10-CM

## 2024-02-13 DIAGNOSIS — E66.01 MORBID OBESITY WITH BMI OF 45.0-49.9, ADULT: ICD-10-CM

## 2024-02-13 DIAGNOSIS — O26.893 RH NEGATIVE STATUS DURING PREGNANCY IN THIRD TRIMESTER: ICD-10-CM

## 2024-02-13 DIAGNOSIS — I10 CHRONIC HYPERTENSION: ICD-10-CM

## 2024-02-13 DIAGNOSIS — Z34.93 PRENATAL CARE IN THIRD TRIMESTER: ICD-10-CM

## 2024-02-13 LAB
ALP SERPL-CCNC: 110 U/L (ref 39–117)
ALT SERPL-CCNC: 19 U/L (ref 1–33)
AST SERPL-CCNC: 14 U/L (ref 1–32)
BASOPHILS # BLD AUTO: 0.01 10*3/MM3 (ref 0–0.2)
BASOPHILS NFR BLD AUTO: 0.1 % (ref 0–1.5)
BILIRUB SERPL-MCNC: 0.3 MG/DL (ref 0–1.2)
CREAT SERPL-MCNC: 0.58 MG/DL (ref 0.57–1)
EGFRCR SERPLBLD CKD-EPI 2021: 125 ML/MIN/1.73
EOSINOPHIL # BLD AUTO: 0.01 10*3/MM3 (ref 0–0.4)
EOSINOPHIL NFR BLD AUTO: 0.1 % (ref 0.3–6.2)
ERYTHROCYTE [DISTWIDTH] IN BLOOD BY AUTOMATED COUNT: 12.6 % (ref 12.3–15.4)
GLUCOSE UR STRIP-MCNC: NEGATIVE MG/DL
HCT VFR BLD AUTO: 33.7 % (ref 34–46.6)
HGB BLD-MCNC: 11.4 G/DL (ref 12–15.9)
IMM GRANULOCYTES # BLD AUTO: 0.04 10*3/MM3 (ref 0–0.05)
IMM GRANULOCYTES NFR BLD AUTO: 0.5 % (ref 0–0.5)
LDH SERPL L TO P-CCNC: 148 U/L (ref 135–214)
LYMPHOCYTES # BLD AUTO: 1.69 10*3/MM3 (ref 0.7–3.1)
LYMPHOCYTES NFR BLD AUTO: 19.2 % (ref 19.6–45.3)
MCH RBC QN AUTO: 31.7 PG (ref 26.6–33)
MCHC RBC AUTO-ENTMCNC: 33.8 G/DL (ref 31.5–35.7)
MCV RBC AUTO: 93.6 FL (ref 79–97)
MONOCYTES # BLD AUTO: 0.57 10*3/MM3 (ref 0.1–0.9)
MONOCYTES NFR BLD AUTO: 6.5 % (ref 5–12)
NEUTROPHILS # BLD AUTO: 6.49 10*3/MM3 (ref 1.7–7)
NEUTROPHILS NFR BLD AUTO: 73.6 % (ref 42.7–76)
NRBC BLD AUTO-RTO: 0 /100 WBC (ref 0–0.2)
PLATELET # BLD AUTO: 233 10*3/MM3 (ref 140–450)
PROT UR STRIP-MCNC: ABNORMAL MG/DL
RBC # BLD AUTO: 3.6 10*6/MM3 (ref 3.77–5.28)
URATE SERPL-MCNC: 2.9 MG/DL (ref 2.4–5.7)
WBC # BLD AUTO: 8.81 10*3/MM3 (ref 3.4–10.8)

## 2024-02-13 RX ORDER — METRONIDAZOLE 500 MG/1
500 TABLET ORAL 2 TIMES DAILY
Qty: 14 TABLET | Refills: 0 | Status: SHIPPED | OUTPATIENT
Start: 2024-02-13 | End: 2024-02-21

## 2024-02-14 ENCOUNTER — PREP FOR SURGERY (OUTPATIENT)
Dept: OTHER | Facility: HOSPITAL | Age: 29
End: 2024-02-14
Payer: COMMERCIAL

## 2024-02-14 ENCOUNTER — TELEPHONE (OUTPATIENT)
Dept: OBSTETRICS AND GYNECOLOGY | Facility: CLINIC | Age: 29
End: 2024-02-14
Payer: COMMERCIAL

## 2024-02-14 DIAGNOSIS — O10.919 CHRONIC HYPERTENSION AFFECTING PREGNANCY: Primary | ICD-10-CM

## 2024-02-14 RX ORDER — OXYTOCIN/0.9 % SODIUM CHLORIDE 30/500 ML
250 PLASTIC BAG, INJECTION (ML) INTRAVENOUS CONTINUOUS
Status: CANCELLED | OUTPATIENT
Start: 2024-02-14 | End: 2024-02-14

## 2024-02-14 RX ORDER — ONDANSETRON 4 MG/1
4 TABLET, ORALLY DISINTEGRATING ORAL EVERY 6 HOURS PRN
Status: CANCELLED | OUTPATIENT
Start: 2024-02-14

## 2024-02-14 RX ORDER — IBUPROFEN 600 MG/1
600 TABLET ORAL EVERY 6 HOURS PRN
Status: CANCELLED | OUTPATIENT
Start: 2024-02-14

## 2024-02-14 RX ORDER — OXYTOCIN/0.9 % SODIUM CHLORIDE 30/500 ML
2-20 PLASTIC BAG, INJECTION (ML) INTRAVENOUS
Status: CANCELLED | OUTPATIENT
Start: 2024-02-14

## 2024-02-14 RX ORDER — SODIUM CHLORIDE 0.9 % (FLUSH) 0.9 %
10 SYRINGE (ML) INJECTION EVERY 12 HOURS SCHEDULED
Status: CANCELLED | OUTPATIENT
Start: 2024-02-14

## 2024-02-14 RX ORDER — SODIUM CHLORIDE, SODIUM LACTATE, POTASSIUM CHLORIDE, CALCIUM CHLORIDE 600; 310; 30; 20 MG/100ML; MG/100ML; MG/100ML; MG/100ML
125 INJECTION, SOLUTION INTRAVENOUS CONTINUOUS
Status: CANCELLED | OUTPATIENT
Start: 2024-02-14

## 2024-02-14 RX ORDER — SODIUM CHLORIDE 0.9 % (FLUSH) 0.9 %
10 SYRINGE (ML) INJECTION AS NEEDED
Status: CANCELLED | OUTPATIENT
Start: 2024-02-14

## 2024-02-14 RX ORDER — MISOPROSTOL 200 UG/1
800 TABLET ORAL AS NEEDED
Status: CANCELLED | OUTPATIENT
Start: 2024-02-14

## 2024-02-14 RX ORDER — ACETAMINOPHEN 325 MG/1
650 TABLET ORAL EVERY 4 HOURS PRN
Status: CANCELLED | OUTPATIENT
Start: 2024-02-14

## 2024-02-14 RX ORDER — METHYLERGONOVINE MALEATE 0.2 MG/ML
200 INJECTION INTRAVENOUS ONCE AS NEEDED
Status: CANCELLED | OUTPATIENT
Start: 2024-02-14

## 2024-02-14 RX ORDER — SODIUM CHLORIDE 9 MG/ML
40 INJECTION, SOLUTION INTRAVENOUS AS NEEDED
Status: CANCELLED | OUTPATIENT
Start: 2024-02-14

## 2024-02-14 RX ORDER — MAGNESIUM CARB/ALUMINUM HYDROX 105-160MG
30 TABLET,CHEWABLE ORAL ONCE
Status: CANCELLED | OUTPATIENT
Start: 2024-02-14 | End: 2024-02-14

## 2024-02-14 RX ORDER — ONDANSETRON 2 MG/ML
4 INJECTION INTRAMUSCULAR; INTRAVENOUS EVERY 6 HOURS PRN
Status: CANCELLED | OUTPATIENT
Start: 2024-02-14

## 2024-02-14 RX ORDER — LIDOCAINE HYDROCHLORIDE 10 MG/ML
0.5 INJECTION, SOLUTION EPIDURAL; INFILTRATION; INTRACAUDAL; PERINEURAL ONCE AS NEEDED
Status: CANCELLED | OUTPATIENT
Start: 2024-02-14

## 2024-02-14 RX ORDER — CARBOPROST TROMETHAMINE 250 UG/ML
250 INJECTION, SOLUTION INTRAMUSCULAR AS NEEDED
Status: CANCELLED | OUTPATIENT
Start: 2024-02-14

## 2024-02-14 RX ORDER — HYDROCODONE BITARTRATE AND ACETAMINOPHEN 10; 325 MG/1; MG/1
1 TABLET ORAL EVERY 4 HOURS PRN
Status: CANCELLED | OUTPATIENT
Start: 2024-02-14 | End: 2024-02-24

## 2024-02-14 RX ORDER — OXYTOCIN/0.9 % SODIUM CHLORIDE 30/500 ML
999 PLASTIC BAG, INJECTION (ML) INTRAVENOUS ONCE
Status: CANCELLED | OUTPATIENT
Start: 2024-02-14

## 2024-02-15 ENCOUNTER — HOSPITAL ENCOUNTER (INPATIENT)
Facility: HOSPITAL | Age: 29
LOS: 2 days | Discharge: HOME | End: 2024-02-17
Attending: OBSTETRICS & GYNECOLOGY | Admitting: OBSTETRICS & GYNECOLOGY
Payer: COMMERCIAL

## 2024-02-15 ENCOUNTER — ANESTHESIA (OUTPATIENT)
Dept: LABOR AND DELIVERY | Facility: HOSPITAL | Age: 29
End: 2024-02-15
Payer: COMMERCIAL

## 2024-02-15 ENCOUNTER — HOSPITAL ENCOUNTER (OUTPATIENT)
Dept: LABOR AND DELIVERY | Facility: HOSPITAL | Age: 29
Discharge: HOME | End: 2024-02-15
Payer: COMMERCIAL

## 2024-02-15 ENCOUNTER — ANESTHESIA EVENT (OUTPATIENT)
Dept: LABOR AND DELIVERY | Facility: HOSPITAL | Age: 29
End: 2024-02-15
Payer: COMMERCIAL

## 2024-02-15 DIAGNOSIS — Z98.891 STATUS POST CESAREAN SECTION: Primary | ICD-10-CM

## 2024-02-15 DIAGNOSIS — O10.919 CHRONIC HYPERTENSION AFFECTING PREGNANCY: ICD-10-CM

## 2024-02-15 PROBLEM — Z90.79 STATUS POST BILATERAL SALPINGECTOMY: Status: ACTIVE | Noted: 2024-02-15

## 2024-02-15 LAB
ABO GROUP BLD: NORMAL
ABO GROUP BLD: NORMAL
ATMOSPHERIC PRESS: ABNORMAL MM[HG]
ATMOSPHERIC PRESS: ABNORMAL MM[HG]
BASE EXCESS BLDCOA CALC-SCNC: -3.4 MMOL/L (ref 0–2)
BASE EXCESS BLDCOV CALC-SCNC: -2.3 MMOL/L (ref 0–2)
BDY SITE: ABNORMAL
BDY SITE: ABNORMAL
BLD GP AB SCN SERPL QL: NEGATIVE
BODY TEMPERATURE: 37
BODY TEMPERATURE: 37
CO2 BLDA-SCNC: 26.2 MMOL/L (ref 22–33)
CO2 BLDA-SCNC: 28.1 MMOL/L (ref 22–33)
DEPRECATED RDW RBC AUTO: 48 FL (ref 37–54)
EPAP: 0
EPAP: 0
ERYTHROCYTE [DISTWIDTH] IN BLOOD BY AUTOMATED COUNT: 13.4 % (ref 12.3–15.4)
FETAL BLEED: NEGATIVE
HCO3 BLDCOA-SCNC: 26 MMOL/L (ref 16.9–20.5)
HCO3 BLDCOV-SCNC: 24.7 MMOL/L (ref 18.6–21.4)
HCT VFR BLD AUTO: 34.2 % (ref 34–46.6)
HGB BLD-MCNC: 11.7 G/DL (ref 12–15.9)
HGB BLDA-MCNC: 12.1 G/DL (ref 14–18)
HGB BLDA-MCNC: 12.5 G/DL (ref 14–18)
INHALED O2 CONCENTRATION: 21 %
INHALED O2 CONCENTRATION: 21 %
IPAP: 0
IPAP: 0
MCH RBC QN AUTO: 33.1 PG (ref 26.6–33)
MCHC RBC AUTO-ENTMCNC: 34.2 G/DL (ref 31.5–35.7)
MCV RBC AUTO: 96.6 FL (ref 79–97)
MODALITY: ABNORMAL
MODALITY: ABNORMAL
NOTE: 0
NUMBER OF DOSES: NORMAL
PAW @ PEAK INSP FLOW SETTING VENT: 0 CMH2O
PAW @ PEAK INSP FLOW SETTING VENT: 0 CMH2O
PCO2 BLDCOA: 67.7 MMHG (ref 43.3–54.9)
PCO2 BLDCOV: 50.5 MM HG (ref 28–40)
PH BLDCOA: 7.19 PH UNITS (ref 7.22–7.3)
PH BLDCOV: 7.3 PH UNITS (ref 7.31–7.37)
PLATELET # BLD AUTO: 226 10*3/MM3 (ref 140–450)
PMV BLD AUTO: 11.7 FL (ref 6–12)
PO2 BLDCOA: 11.2 MMHG (ref 11.5–43.3)
PO2 BLDCOV: 29.5 MM HG (ref 21–31)
RBC # BLD AUTO: 3.54 10*6/MM3 (ref 3.77–5.28)
RH BLD: NEGATIVE
RH BLD: NEGATIVE
SAO2 % BLDCOA: 13.8 %
SAO2 % BLDCOA: ABNORMAL %
SAO2 % BLDCOV: 67.3 %
T PALLIDUM IGG SER QL: NORMAL
T VAGINALIS RRNA SPEC QL NAA+PROBE: POSITIVE
T&S EXPIRATION DATE: NORMAL
TOTAL RATE: 0 BREATHS/MINUTE
TOTAL RATE: 0 BREATHS/MINUTE
VENTILATOR MODE: ABNORMAL
WBC NRBC COR # BLD AUTO: 8.94 10*3/MM3 (ref 3.4–10.8)

## 2024-02-15 PROCEDURE — 25010000002 MIDAZOLAM PER 1 MG: Performed by: NURSE ANESTHETIST, CERTIFIED REGISTERED

## 2024-02-15 PROCEDURE — 36415 COLL VENOUS BLD VENIPUNCTURE: CPT | Performed by: OBSTETRICS & GYNECOLOGY

## 2024-02-15 PROCEDURE — C1755 CATHETER, INTRASPINAL: HCPCS | Performed by: ANESTHESIOLOGY

## 2024-02-15 PROCEDURE — 86900 BLOOD TYPING SEROLOGIC ABO: CPT | Performed by: OBSTETRICS & GYNECOLOGY

## 2024-02-15 PROCEDURE — 88302 TISSUE EXAM BY PATHOLOGIST: CPT | Performed by: OBSTETRICS & GYNECOLOGY

## 2024-02-15 PROCEDURE — 86850 RBC ANTIBODY SCREEN: CPT | Performed by: OBSTETRICS & GYNECOLOGY

## 2024-02-15 PROCEDURE — 59515 CESAREAN DELIVERY: CPT | Performed by: OBSTETRICS & GYNECOLOGY

## 2024-02-15 PROCEDURE — 25010000002 MORPHINE PER 10 MG: Performed by: NURSE ANESTHETIST, CERTIFIED REGISTERED

## 2024-02-15 PROCEDURE — 25010000002 FENTANYL CITRATE (PF) 50 MCG/ML SOLUTION: Performed by: NURSE ANESTHETIST, CERTIFIED REGISTERED

## 2024-02-15 PROCEDURE — 25010000002 ONDANSETRON PER 1 MG: Performed by: OBSTETRICS & GYNECOLOGY

## 2024-02-15 PROCEDURE — 59514 CESAREAN DELIVERY ONLY: CPT | Performed by: OBSTETRICS & GYNECOLOGY

## 2024-02-15 PROCEDURE — 86780 TREPONEMA PALLIDUM: CPT | Performed by: OBSTETRICS & GYNECOLOGY

## 2024-02-15 PROCEDURE — 58611 LIGATE OVIDUCT(S) ADD-ON: CPT | Performed by: OBSTETRICS & GYNECOLOGY

## 2024-02-15 PROCEDURE — 25010000002 ONDANSETRON PER 1 MG: Performed by: NURSE ANESTHETIST, CERTIFIED REGISTERED

## 2024-02-15 PROCEDURE — 25010000002 KETOROLAC TROMETHAMINE PER 15 MG: Performed by: OBSTETRICS & GYNECOLOGY

## 2024-02-15 PROCEDURE — 25810000003 LACTATED RINGERS PER 1000 ML: Performed by: OBSTETRICS & GYNECOLOGY

## 2024-02-15 PROCEDURE — 85027 COMPLETE CBC AUTOMATED: CPT | Performed by: OBSTETRICS & GYNECOLOGY

## 2024-02-15 PROCEDURE — 25010000002 TERBUTALINE PER 1 MG

## 2024-02-15 PROCEDURE — C1765 ADHESION BARRIER: HCPCS | Performed by: OBSTETRICS & GYNECOLOGY

## 2024-02-15 PROCEDURE — 86901 BLOOD TYPING SEROLOGIC RH(D): CPT | Performed by: OBSTETRICS & GYNECOLOGY

## 2024-02-15 PROCEDURE — 85461 HEMOGLOBIN FETAL: CPT | Performed by: OBSTETRICS & GYNECOLOGY

## 2024-02-15 PROCEDURE — 25010000002 ROPIVACAINE PER 1 MG: Performed by: NURSE ANESTHETIST, CERTIFIED REGISTERED

## 2024-02-15 PROCEDURE — 25010000002 METOCLOPRAMIDE PER 10 MG: Performed by: NURSE ANESTHETIST, CERTIFIED REGISTERED

## 2024-02-15 PROCEDURE — 82805 BLOOD GASES W/O2 SATURATION: CPT

## 2024-02-15 PROCEDURE — 25010000002 PROMETHAZINE PER 50 MG: Performed by: OBSTETRICS & GYNECOLOGY

## 2024-02-15 PROCEDURE — 63710000001 PROMETHAZINE PER 25 MG: Performed by: OBSTETRICS & GYNECOLOGY

## 2024-02-15 PROCEDURE — 25010000002 CEFAZOLIN PER 500 MG

## 2024-02-15 PROCEDURE — 63710000001 ONDANSETRON ODT 4 MG TABLET DISPERSIBLE: Performed by: OBSTETRICS & GYNECOLOGY

## 2024-02-15 DEVICE — ABSORBABLE ADHESION BARRIER
Type: IMPLANTABLE DEVICE | Site: UTERUS | Status: FUNCTIONAL
Brand: GYNECARE INTERCEED

## 2024-02-15 RX ORDER — MISOPROSTOL 200 UG/1
800 TABLET ORAL AS NEEDED
Status: DISCONTINUED | OUTPATIENT
Start: 2024-02-15 | End: 2024-02-17 | Stop reason: HOSPADM

## 2024-02-15 RX ORDER — HYDROXYZINE HYDROCHLORIDE 25 MG/1
50 TABLET, FILM COATED ORAL EVERY 6 HOURS PRN
Status: DISCONTINUED | OUTPATIENT
Start: 2024-02-15 | End: 2024-02-17 | Stop reason: HOSPADM

## 2024-02-15 RX ORDER — METHYLERGONOVINE MALEATE 0.2 MG/ML
200 INJECTION INTRAVENOUS ONCE AS NEEDED
Status: DISCONTINUED | OUTPATIENT
Start: 2024-02-15 | End: 2024-02-17 | Stop reason: HOSPADM

## 2024-02-15 RX ORDER — MORPHINE SULFATE 2 MG/ML
2 INJECTION, SOLUTION INTRAMUSCULAR; INTRAVENOUS ONCE
Status: DISCONTINUED | OUTPATIENT
Start: 2024-02-15 | End: 2024-02-15

## 2024-02-15 RX ORDER — MORPHINE SULFATE 0.5 MG/ML
INJECTION, SOLUTION EPIDURAL; INTRATHECAL; INTRAVENOUS AS NEEDED
Status: DISCONTINUED | OUTPATIENT
Start: 2024-02-15 | End: 2024-02-15 | Stop reason: SURG

## 2024-02-15 RX ORDER — DOCUSATE SODIUM 100 MG/1
100 CAPSULE, LIQUID FILLED ORAL 2 TIMES DAILY PRN
Status: DISCONTINUED | OUTPATIENT
Start: 2024-02-15 | End: 2024-02-17 | Stop reason: HOSPADM

## 2024-02-15 RX ORDER — CALCIUM CARBONATE 500 MG/1
1 TABLET, CHEWABLE ORAL EVERY 4 HOURS PRN
Status: DISCONTINUED | OUTPATIENT
Start: 2024-02-15 | End: 2024-02-17 | Stop reason: HOSPADM

## 2024-02-15 RX ORDER — CARBOPROST TROMETHAMINE 250 UG/ML
250 INJECTION, SOLUTION INTRAMUSCULAR AS NEEDED
Status: DISCONTINUED | OUTPATIENT
Start: 2024-02-15 | End: 2024-02-17 | Stop reason: HOSPADM

## 2024-02-15 RX ORDER — DIPHENHYDRAMINE HYDROCHLORIDE 50 MG/ML
25 INJECTION INTRAMUSCULAR; INTRAVENOUS ONCE AS NEEDED
Status: DISCONTINUED | OUTPATIENT
Start: 2024-02-15 | End: 2024-02-17 | Stop reason: HOSPADM

## 2024-02-15 RX ORDER — ONDANSETRON 2 MG/ML
4 INJECTION INTRAMUSCULAR; INTRAVENOUS ONCE AS NEEDED
Status: ACTIVE | OUTPATIENT
Start: 2024-02-15 | End: 2024-02-16

## 2024-02-15 RX ORDER — SODIUM CHLORIDE 9 MG/ML
40 INJECTION, SOLUTION INTRAVENOUS AS NEEDED
Status: DISCONTINUED | OUTPATIENT
Start: 2024-02-15 | End: 2024-02-15

## 2024-02-15 RX ORDER — OXYTOCIN/0.9 % SODIUM CHLORIDE 30/500 ML
125 PLASTIC BAG, INJECTION (ML) INTRAVENOUS CONTINUOUS PRN
Status: DISCONTINUED | OUTPATIENT
Start: 2024-02-15 | End: 2024-02-17 | Stop reason: HOSPADM

## 2024-02-15 RX ORDER — SIMETHICONE 80 MG
80 TABLET,CHEWABLE ORAL 4 TIMES DAILY PRN
Status: DISCONTINUED | OUTPATIENT
Start: 2024-02-15 | End: 2024-02-17 | Stop reason: HOSPADM

## 2024-02-15 RX ORDER — KETOROLAC TROMETHAMINE 30 MG/ML
30 INJECTION, SOLUTION INTRAMUSCULAR; INTRAVENOUS ONCE
Status: COMPLETED | OUTPATIENT
Start: 2024-02-15 | End: 2024-02-15

## 2024-02-15 RX ORDER — LIDOCAINE HCL/EPINEPHRINE/PF 2%-1:200K
VIAL (ML) INJECTION AS NEEDED
Status: DISCONTINUED | OUTPATIENT
Start: 2024-02-15 | End: 2024-02-15 | Stop reason: SURG

## 2024-02-15 RX ORDER — ACETAMINOPHEN 500 MG
1000 TABLET ORAL EVERY 6 HOURS
Status: ACTIVE | OUTPATIENT
Start: 2024-02-15 | End: 2024-02-16

## 2024-02-15 RX ORDER — KETOROLAC TROMETHAMINE 15 MG/ML
15 INJECTION, SOLUTION INTRAMUSCULAR; INTRAVENOUS EVERY 6 HOURS
Status: COMPLETED | OUTPATIENT
Start: 2024-02-15 | End: 2024-02-16

## 2024-02-15 RX ORDER — LIDOCAINE HYDROCHLORIDE 10 MG/ML
0.5 INJECTION, SOLUTION EPIDURAL; INFILTRATION; INTRACAUDAL; PERINEURAL ONCE AS NEEDED
Status: DISCONTINUED | OUTPATIENT
Start: 2024-02-15 | End: 2024-02-15

## 2024-02-15 RX ORDER — HYDROCODONE BITARTRATE AND ACETAMINOPHEN 10; 325 MG/1; MG/1
1 TABLET ORAL EVERY 4 HOURS PRN
Status: DISCONTINUED | OUTPATIENT
Start: 2024-02-15 | End: 2024-02-17 | Stop reason: HOSPADM

## 2024-02-15 RX ORDER — DIPHENHYDRAMINE HCL 25 MG
25 CAPSULE ORAL EVERY 4 HOURS PRN
Status: DISCONTINUED | OUTPATIENT
Start: 2024-02-15 | End: 2024-02-17 | Stop reason: HOSPADM

## 2024-02-15 RX ORDER — NALOXONE HCL 0.4 MG/ML
0.4 VIAL (ML) INJECTION
Status: DISCONTINUED | OUTPATIENT
Start: 2024-02-15 | End: 2024-02-15

## 2024-02-15 RX ORDER — METOCLOPRAMIDE HYDROCHLORIDE 5 MG/ML
INJECTION INTRAMUSCULAR; INTRAVENOUS AS NEEDED
Status: DISCONTINUED | OUTPATIENT
Start: 2024-02-15 | End: 2024-02-15 | Stop reason: SURG

## 2024-02-15 RX ORDER — ACETAMINOPHEN 325 MG/1
650 TABLET ORAL EVERY 4 HOURS PRN
Status: DISCONTINUED | OUTPATIENT
Start: 2024-02-15 | End: 2024-02-15

## 2024-02-15 RX ORDER — OXYTOCIN/0.9 % SODIUM CHLORIDE 30/500 ML
250 PLASTIC BAG, INJECTION (ML) INTRAVENOUS CONTINUOUS
Status: ACTIVE | OUTPATIENT
Start: 2024-02-15 | End: 2024-02-15

## 2024-02-15 RX ORDER — ENOXAPARIN SODIUM 100 MG/ML
40 INJECTION SUBCUTANEOUS EVERY 12 HOURS
Status: DISCONTINUED | OUTPATIENT
Start: 2024-02-16 | End: 2024-02-17 | Stop reason: HOSPADM

## 2024-02-15 RX ORDER — ONDANSETRON 2 MG/ML
4 INJECTION INTRAMUSCULAR; INTRAVENOUS EVERY 6 HOURS PRN
Status: DISCONTINUED | OUTPATIENT
Start: 2024-02-15 | End: 2024-02-17 | Stop reason: HOSPADM

## 2024-02-15 RX ORDER — SODIUM CHLORIDE 0.9 % (FLUSH) 0.9 %
10 SYRINGE (ML) INJECTION AS NEEDED
Status: DISCONTINUED | OUTPATIENT
Start: 2024-02-15 | End: 2024-02-15

## 2024-02-15 RX ORDER — MISOPROSTOL 200 UG/1
600 TABLET ORAL AS NEEDED
Status: DISCONTINUED | OUTPATIENT
Start: 2024-02-15 | End: 2024-02-17 | Stop reason: HOSPADM

## 2024-02-15 RX ORDER — SODIUM CHLORIDE 0.9 % (FLUSH) 0.9 %
10 SYRINGE (ML) INJECTION EVERY 12 HOURS SCHEDULED
Status: DISCONTINUED | OUTPATIENT
Start: 2024-02-15 | End: 2024-02-15

## 2024-02-15 RX ORDER — FAMOTIDINE 10 MG/ML
INJECTION, SOLUTION INTRAVENOUS AS NEEDED
Status: DISCONTINUED | OUTPATIENT
Start: 2024-02-15 | End: 2024-02-15 | Stop reason: SURG

## 2024-02-15 RX ORDER — ONDANSETRON 4 MG/1
4 TABLET, ORALLY DISINTEGRATING ORAL EVERY 8 HOURS PRN
Status: DISCONTINUED | OUTPATIENT
Start: 2024-02-15 | End: 2024-02-17 | Stop reason: HOSPADM

## 2024-02-15 RX ORDER — FENTANYL CITRATE 50 UG/ML
INJECTION, SOLUTION INTRAMUSCULAR; INTRAVENOUS AS NEEDED
Status: DISCONTINUED | OUTPATIENT
Start: 2024-02-15 | End: 2024-02-15 | Stop reason: SURG

## 2024-02-15 RX ORDER — ONDANSETRON 2 MG/ML
4 INJECTION INTRAMUSCULAR; INTRAVENOUS EVERY 6 HOURS PRN
Status: DISCONTINUED | OUTPATIENT
Start: 2024-02-15 | End: 2024-02-15

## 2024-02-15 RX ORDER — TERBUTALINE SULFATE 1 MG/ML
INJECTION, SOLUTION SUBCUTANEOUS
Status: COMPLETED
Start: 2024-02-15 | End: 2024-02-15

## 2024-02-15 RX ORDER — EPHEDRINE SULFATE 5 MG/ML
INJECTION INTRAVENOUS
Status: DISCONTINUED
Start: 2024-02-15 | End: 2024-02-17 | Stop reason: HOSPADM

## 2024-02-15 RX ORDER — OXYTOCIN/0.9 % SODIUM CHLORIDE 30/500 ML
2-20 PLASTIC BAG, INJECTION (ML) INTRAVENOUS
Status: DISCONTINUED | OUTPATIENT
Start: 2024-02-15 | End: 2024-02-15

## 2024-02-15 RX ORDER — PRENATAL VIT/IRON FUM/FOLIC AC 27MG-0.8MG
1 TABLET ORAL DAILY
Status: DISCONTINUED | OUTPATIENT
Start: 2024-02-15 | End: 2024-02-17 | Stop reason: HOSPADM

## 2024-02-15 RX ORDER — PROMETHAZINE HYDROCHLORIDE 12.5 MG/1
12.5 SUPPOSITORY RECTAL ONCE AS NEEDED
Status: COMPLETED | OUTPATIENT
Start: 2024-02-15 | End: 2024-02-15

## 2024-02-15 RX ORDER — MAGNESIUM CARB/ALUMINUM HYDROX 105-160MG
30 TABLET,CHEWABLE ORAL ONCE
Status: DISCONTINUED | OUTPATIENT
Start: 2024-02-15 | End: 2024-02-15

## 2024-02-15 RX ORDER — NALOXONE HCL 0.4 MG/ML
0.4 VIAL (ML) INJECTION
Status: ACTIVE | OUTPATIENT
Start: 2024-02-15 | End: 2024-02-16

## 2024-02-15 RX ORDER — ACETAMINOPHEN 325 MG/1
650 TABLET ORAL EVERY 4 HOURS PRN
Status: DISCONTINUED | OUTPATIENT
Start: 2024-02-15 | End: 2024-02-17 | Stop reason: HOSPADM

## 2024-02-15 RX ORDER — ONDANSETRON 2 MG/ML
INJECTION INTRAMUSCULAR; INTRAVENOUS AS NEEDED
Status: DISCONTINUED | OUTPATIENT
Start: 2024-02-15 | End: 2024-02-15 | Stop reason: SURG

## 2024-02-15 RX ORDER — OXYTOCIN/0.9 % SODIUM CHLORIDE 30/500 ML
999 PLASTIC BAG, INJECTION (ML) INTRAVENOUS ONCE
Status: DISCONTINUED | OUTPATIENT
Start: 2024-02-15 | End: 2024-02-17 | Stop reason: HOSPADM

## 2024-02-15 RX ORDER — METRONIDAZOLE 500 MG/1
500 TABLET ORAL 2 TIMES DAILY
Status: DISCONTINUED | OUTPATIENT
Start: 2024-02-15 | End: 2024-02-17 | Stop reason: HOSPADM

## 2024-02-15 RX ORDER — CITRIC ACID/SODIUM CITRATE 334-500MG
SOLUTION, ORAL ORAL
Status: COMPLETED
Start: 2024-02-15 | End: 2024-02-15

## 2024-02-15 RX ORDER — IBUPROFEN 600 MG/1
600 TABLET ORAL EVERY 6 HOURS PRN
Status: DISCONTINUED | OUTPATIENT
Start: 2024-02-15 | End: 2024-02-17 | Stop reason: HOSPADM

## 2024-02-15 RX ORDER — SODIUM CHLORIDE 9 MG/ML
INJECTION, SOLUTION INTRAVENOUS
Status: DISCONTINUED
Start: 2024-02-15 | End: 2024-02-17 | Stop reason: HOSPADM

## 2024-02-15 RX ORDER — LABETALOL 100 MG/1
100 TABLET, FILM COATED ORAL 2 TIMES DAILY
Status: DISCONTINUED | OUTPATIENT
Start: 2024-02-15 | End: 2024-02-17 | Stop reason: HOSPADM

## 2024-02-15 RX ORDER — ALUMINA, MAGNESIA, AND SIMETHICONE 2400; 2400; 240 MG/30ML; MG/30ML; MG/30ML
15 SUSPENSION ORAL EVERY 4 HOURS PRN
Status: DISCONTINUED | OUTPATIENT
Start: 2024-02-15 | End: 2024-02-17 | Stop reason: HOSPADM

## 2024-02-15 RX ORDER — OXYCODONE HYDROCHLORIDE 5 MG/1
5 TABLET ORAL EVERY 4 HOURS PRN
Status: DISCONTINUED | OUTPATIENT
Start: 2024-02-15 | End: 2024-02-17 | Stop reason: HOSPADM

## 2024-02-15 RX ORDER — PROMETHAZINE HYDROCHLORIDE 25 MG/1
25 TABLET ORAL ONCE AS NEEDED
Status: COMPLETED | OUTPATIENT
Start: 2024-02-15 | End: 2024-02-15

## 2024-02-15 RX ORDER — ACETAMINOPHEN 325 MG/1
650 TABLET ORAL EVERY 6 HOURS
Status: DISCONTINUED | OUTPATIENT
Start: 2024-02-16 | End: 2024-02-17 | Stop reason: HOSPADM

## 2024-02-15 RX ORDER — IBUPROFEN 600 MG/1
600 TABLET ORAL EVERY 6 HOURS
Status: DISCONTINUED | OUTPATIENT
Start: 2024-02-16 | End: 2024-02-17 | Stop reason: HOSPADM

## 2024-02-15 RX ORDER — LIDOCAINE HYDROCHLORIDE AND EPINEPHRINE 15; 5 MG/ML; UG/ML
INJECTION, SOLUTION EPIDURAL AS NEEDED
Status: DISCONTINUED | OUTPATIENT
Start: 2024-02-15 | End: 2024-02-15 | Stop reason: SURG

## 2024-02-15 RX ORDER — SODIUM CHLORIDE, SODIUM LACTATE, POTASSIUM CHLORIDE, CALCIUM CHLORIDE 600; 310; 30; 20 MG/100ML; MG/100ML; MG/100ML; MG/100ML
125 INJECTION, SOLUTION INTRAVENOUS CONTINUOUS
Status: DISCONTINUED | OUTPATIENT
Start: 2024-02-15 | End: 2024-02-15

## 2024-02-15 RX ORDER — ONDANSETRON 4 MG/1
4 TABLET, ORALLY DISINTEGRATING ORAL EVERY 6 HOURS PRN
Status: DISCONTINUED | OUTPATIENT
Start: 2024-02-15 | End: 2024-02-17 | Stop reason: HOSPADM

## 2024-02-15 RX ORDER — HYDROCORTISONE 25 MG/G
1 CREAM TOPICAL AS NEEDED
Status: DISCONTINUED | OUTPATIENT
Start: 2024-02-15 | End: 2024-02-17 | Stop reason: HOSPADM

## 2024-02-15 RX ORDER — DIPHENHYDRAMINE HYDROCHLORIDE 50 MG/ML
25 INJECTION INTRAMUSCULAR; INTRAVENOUS EVERY 4 HOURS PRN
Status: DISCONTINUED | OUTPATIENT
Start: 2024-02-15 | End: 2024-02-17 | Stop reason: HOSPADM

## 2024-02-15 RX ORDER — OXYCODONE HYDROCHLORIDE 5 MG/1
10 TABLET ORAL EVERY 4 HOURS PRN
Status: DISCONTINUED | OUTPATIENT
Start: 2024-02-15 | End: 2024-02-17 | Stop reason: HOSPADM

## 2024-02-15 RX ORDER — MIDAZOLAM HYDROCHLORIDE 1 MG/ML
INJECTION INTRAMUSCULAR; INTRAVENOUS AS NEEDED
Status: DISCONTINUED | OUTPATIENT
Start: 2024-02-15 | End: 2024-02-15 | Stop reason: SURG

## 2024-02-15 RX ADMIN — FENTANYL CITRATE 100 MCG: 50 INJECTION, SOLUTION INTRAMUSCULAR; INTRAVENOUS at 09:50

## 2024-02-15 RX ADMIN — PROMETHAZINE HYDROCHLORIDE 25 MG: 25 TABLET ORAL at 12:57

## 2024-02-15 RX ADMIN — KETOROLAC TROMETHAMINE 15 MG: 15 INJECTION, SOLUTION INTRAMUSCULAR; INTRAVENOUS at 17:53

## 2024-02-15 RX ADMIN — PROMETHAZINE HYDROCHLORIDE 12.5 MG: 25 INJECTION INTRAMUSCULAR; INTRAVENOUS at 22:22

## 2024-02-15 RX ADMIN — MORPHINE SULFATE 2 MG: 0.5 INJECTION, SOLUTION EPIDURAL; INTRATHECAL; INTRAVENOUS at 10:31

## 2024-02-15 RX ADMIN — SODIUM CHLORIDE, POTASSIUM CHLORIDE, SODIUM LACTATE AND CALCIUM CHLORIDE 125 ML/HR: 600; 310; 30; 20 INJECTION, SOLUTION INTRAVENOUS at 09:01

## 2024-02-15 RX ADMIN — LIDOCAINE HYDROCHLORIDE,EPINEPHRINE BITARTRATE 10 ML: 20; .005 INJECTION, SOLUTION EPIDURAL; INFILTRATION; INTRACAUDAL; PERINEURAL at 10:15

## 2024-02-15 RX ADMIN — METOCLOPRAMIDE 10 MG: 5 INJECTION, SOLUTION INTRAMUSCULAR; INTRAVENOUS at 10:47

## 2024-02-15 RX ADMIN — FAMOTIDINE 20 MG: 10 INJECTION, SOLUTION INTRAVENOUS at 10:37

## 2024-02-15 RX ADMIN — LABETALOL HYDROCHLORIDE 100 MG: 100 TABLET, FILM COATED ORAL at 21:42

## 2024-02-15 RX ADMIN — MIDAZOLAM HYDROCHLORIDE 1 MG: 1 INJECTION, SOLUTION INTRAMUSCULAR; INTRAVENOUS at 10:30

## 2024-02-15 RX ADMIN — MORPHINE SULFATE 1 MG: 0.5 INJECTION, SOLUTION EPIDURAL; INTRATHECAL; INTRAVENOUS at 10:36

## 2024-02-15 RX ADMIN — SODIUM CHLORIDE, POTASSIUM CHLORIDE, SODIUM LACTATE AND CALCIUM CHLORIDE 125 ML/HR: 600; 310; 30; 20 INJECTION, SOLUTION INTRAVENOUS at 08:09

## 2024-02-15 RX ADMIN — ONDANSETRON 4 MG: 2 INJECTION INTRAMUSCULAR; INTRAVENOUS at 10:37

## 2024-02-15 RX ADMIN — KETOROLAC TROMETHAMINE 30 MG: 30 INJECTION, SOLUTION INTRAMUSCULAR; INTRAVENOUS at 12:26

## 2024-02-15 RX ADMIN — LIDOCAINE HYDROCHLORIDE AND EPINEPHRINE 3 ML: 15; 5 INJECTION, SOLUTION EPIDURAL at 09:47

## 2024-02-15 RX ADMIN — METRONIDAZOLE 500 MG: 500 TABLET ORAL at 21:42

## 2024-02-15 RX ADMIN — ONDANSETRON 4 MG: 4 TABLET, ORALLY DISINTEGRATING ORAL at 17:59

## 2024-02-15 RX ADMIN — MIDAZOLAM HYDROCHLORIDE 2 MG: 1 INJECTION, SOLUTION INTRAMUSCULAR; INTRAVENOUS at 09:38

## 2024-02-15 RX ADMIN — ACETAMINOPHEN 1000 MG: 325 TABLET ORAL at 17:53

## 2024-02-15 RX ADMIN — SODIUM CITRATE AND CITRIC ACID MONOHYDRATE 30 ML: 500; 334 SOLUTION ORAL at 10:15

## 2024-02-15 RX ADMIN — Medication 500 ML: at 10:50

## 2024-02-15 RX ADMIN — ROPIVACAINE HYDROCHLORIDE 10 ML: 5 INJECTION, SOLUTION EPIDURAL; INFILTRATION; PERINEURAL at 09:55

## 2024-02-15 RX ADMIN — ONDANSETRON 4 MG: 2 INJECTION INTRAMUSCULAR; INTRAVENOUS at 09:44

## 2024-02-15 RX ADMIN — Medication 2 MILLI-UNITS/MIN: at 08:30

## 2024-02-15 RX ADMIN — TERBUTALINE SULFATE 1 MG: 1 INJECTION SUBCUTANEOUS at 09:16

## 2024-02-15 RX ADMIN — CEFAZOLIN: 2 INJECTION, POWDER, LYOPHILIZED, FOR SOLUTION INTRAVENOUS at 10:15

## 2024-02-15 NOTE — OP NOTE
Section Procedure Note    Indications: Breech presentation    Pre-operative Diagnosis: 1: 38w0d                Chronic hypertension affecting pregnancy    Status post  section    Status post bilateral salpingectomy                Post-operative Diagnosis: 1:  Same.      Procedures:  Procedure(s):   SECTION PRIMARYLTUI    Surgeon:  Keturah Kern MD    Assistant:   Enrique Huffman DO   was responsible for performing the following activities: Retraction, Suction, and Irrigation and their skilled assistance was necessary for the success of this case.     Anesthesia:  Epidural    Estimated Blood Loss:  800 cc    Infant:            Gender: female  infant    Weight: 3280 g (7 lb 3.7 oz)     Apgars: 8  @ 1 minute /     9  @ 5 minutes               Findings:       The infant was delivered from the Presentation/Position: Breech ;        The amnionic fluid was Clear     Drains:  Lincoln catheter to straight drainage.                 Specimens: * No order type specified *           Antibiotics:  Cefazolin           Complications:  None; patient tolerated the procedure well.           Disposition: PACU - hemodynamically stable.           Condition: stable    Procedure Details   The patient was seen in the Holding Room. The risks, benefits, complications, treatment options, and expected outcomes were discussed with the patient.  The patient concurred with the proposed plan, giving informed consent.  The patient was taken to the Operating Room, identified as Laura Riley and the procedure verified as  Delivery. A Time Out was held and the above information confirmed.    After an adequate level of anesthesia was obtained, the patient was draped and prepped in the usual sterile manner. A Pfannenstiel incision was made and carried down through the subcutaneous tissue to the fascia. Fascial incision was made and extended transversely with the Mary scissors. The fascia was  bluntly and  sharply from the underlying rectus tissue superiorly and inferiorly. The rectus muscles were divided sharply. The peritoneum was identified and entered. Peritoneal incision was extended longitudinally taking care not to injure the bladder and bowel.  The bladder flap was sharply and bluntly developed  A low transverse uterine incision was made with the scalpel.  Delivered from  Breech .  After the umbilical cord was milked, clamped and cut, cord blood was obtained for evaluation. The placenta was expressed intact and appeared normal.  The uterus was everted from the abdomen and curetted with a moist lap sponge x 2.    The uterine outline, tubes and ovaries appeared normal. The uterine incision was closed in single layers with a running continuous locking suture of #1 chromic. Hemostasis was obtained.  Irrigation was performed and counts were correct;  The uterus was replaced into the abdomen and the lateral gutters were irrigated.  The incision was reinspected and noted to be hemostatic.  Intercede was placed over the hysterotomy.  The peritoneum was closed with a running continuous suture of 2-0 Vicryl;  The rectus muscles reapproximated with interrupted sutures of 2-0 Vicryl. The fascia was then reapproximated with running sutures of 0 Vicryl. The subcutaneous layer was irrigated, noted to be hemostatic, and closed with 3-0 plain Gut.  The skin was reapproximated with staples and 4-0 Vicryl.    Instrument, sponge, and needle counts were correct prior the abdominal closure and at the conclusion of the case. The patient went to the Recovery Room in stable condition with the watt draining clear urine.       Keturah Kern MD      2/23/2024  17:15 EST

## 2024-02-15 NOTE — CASE MANAGEMENT/SOCIAL WORK
Continued Stay Note  [facility]     Patient Name: Laura Riley  MRN: [id number]  Today's Date: 2/15/2024    Admit Date: 2/15/2024    Plan: MSW available   Discharge Plan       Row Name 02/15/24 1343       Plan    Plan MSW available    Plan Comments Visited pt. She has an adoption plan. She is working with [facility] adoptions and she has chosen an adoptive family. She has signed all forms in adoption packet. Pt wishes not to transfer to Mother baby. She does want to have brief visits upon request.  Further documentation will be in infant's chart.    Final Discharge Disposition Code 01 - home or self-care                   Discharge Codes    No documentation.                       AMY Barney

## 2024-02-15 NOTE — PROGRESS NOTES
AROM  /-1  Vertex felt to be palpable at AROM however presenting part determined to be Breech on u/s.  Epidural received and attempt at ECV unsuccessful therefore patient to  section.  Desires sterilization

## 2024-02-15 NOTE — H&P
"History and Physical  [facility]    Chief Complaint   Patient presents with    Scheduled Induction         * No active hospital problems. *       Laura Riley is a 29 y.o. year old  with an Estimated Date of Delivery: 24 currently at 38w0d presenting with CHTN.    Prenatal care has been with Dr. Kern.  It has been significant for CHTN on meds.  BUFA.       No Additional Complaints Reported    The following portions of the patient's history were reviewed and updated as appropriate:vital signs, allergies, current medications, past medical history, past social history, past surgical history, and problem list.    Review of Systems  Pertinent items are noted in HPI.     Objective     /64   Pulse 109   Temp 98.5 °F (36.9 °C) (Oral)   Ht 170.2 cm (67\")   Wt 132 kg (292 lb)   LMP 2023   Breastfeeding No   BMI 45.73 kg/m²     Physical Exam    General:  well developed; well nourished  no acute distress           Abdomen: soft, non-tender; no masses  no umbilical or inguinal hernias are present  no hepato-splenomegaly       FHT's: reactive and category 1   Cervix: 480   [place]: Contraction are irregular     Lab Review   Labs:   Lab Results (last 24 hours)       Procedure Component Value Units Date/Time    Blood Gas, Venous, Cord [[id number]]  (Abnormal) Collected: 02/15/24 1042    Specimen: Cord Blood Venous from Umbilical Cord Updated: 02/15/24 1043     Site Umbilical     pH, Cord Venous 7.297 pH Units      pCO2, Cord Venous 50.5 mm Hg      pO2, Cord Venous 29.5 mm Hg      HCO3, Cord Venous 24.7 mmol/L      Base Excess, Cord Venous -2.3 mmol/L      O2 Sat, Cord Venous 67.3 %      Hemoglobin, Blood Gas 12.1 g/dL      CO2 Content 26.2 mmol/L      Temperature 37.0     Barometric Pressure for Blood Gas --     Comment: N/A        Modality Room Air     FIO2 21 %      Ventilator Mode --     Rate 0 Breaths/minute      PIP 0 cmH2O      Comment: Meter: [id number]     :  " [id number]        IPAP 0     EPAP 0     O2 Saturation Calculated --     Comment: Calculated O2 saturation result not reported at this site.       Blood Gas, Arterial, Cord [[id number]]  (Abnormal) Collected: 02/15/24 1041    Specimen: Cord Blood Arterial from Umbilical Cord Updated: 02/15/24 1041     Site Umbilical     pH, Cord Arterial 7.19 pH Units      pCO2, Cord Arterial 67.7 mmHg      pO2, Cord Arterial 11.2 mmHg      HCO3, Cord Arterial 26.0 mmol/L      Base Exc, Cord Arterial -3.4 mmol/L      O2 Sat, Cord Arterial 13.8 %      Hemoglobin, Blood Gas 12.5 g/dL      CO2 Content 28.1 mmol/L      Temperature 37.0     Barometric Pressure for Blood Gas --     Comment: N/A        Modality Room Air     FIO2 21 %      Rate 0 Breaths/minute      PIP 0 cmH2O      Comment: Meter: [id number]     :  [id number]        IPAP 0     EPAP 0     Note 0    CBC (No Diff) [[id number]]  (Abnormal) Collected: 02/15/24 0807    Specimen: Blood Updated: 02/15/24 0846     WBC 8.94 10*3/mm3      RBC 3.54 10*6/mm3      Hemoglobin 11.7 g/dL      Hematocrit 34.2 %      MCV 96.6 fL      MCH 33.1 pg      MCHC 34.2 g/dL      RDW 13.4 %      RDW-SD 48.0 fl      MPV 11.7 fL      Platelets 226 10*3/mm3     T Pallidum Antibody w/ reflex RPR [[id number]] Collected: 02/15/24 0807    Specimen: Blood Updated: 02/15/24 0835            Imaging     Imaging Results (Most Recent)       None          Assessment & Plan     ASSESSMENT  IUP at 38w0d  CHTN  Multiparous   [facility]     PLAN  Admit for labor and delivery         Keturah Kern MD  2/15/465607:12 EST

## 2024-02-16 LAB
ALP SERPL-CCNC: 93 U/L (ref 39–117)
ALT SERPL W P-5'-P-CCNC: 20 U/L (ref 1–33)
ANION GAP SERPL CALCULATED.3IONS-SCNC: 10 MMOL/L (ref 5–15)
AST SERPL-CCNC: 16 U/L (ref 1–32)
BASOPHILS # BLD AUTO: 0.02 10*3/MM3 (ref 0–0.2)
BASOPHILS NFR BLD AUTO: 0.2 % (ref 0–1.5)
BILIRUB SERPL-MCNC: 0.3 MG/DL (ref 0–1.2)
BUN SERPL-MCNC: 5 MG/DL (ref 6–20)
BUN/CREAT SERPL: 12.8 (ref 7–25)
CALCIUM SPEC-SCNC: 8.2 MG/DL (ref 8.6–10.5)
CHLORIDE SERPL-SCNC: 105 MMOL/L (ref 98–107)
CO2 SERPL-SCNC: 22 MMOL/L (ref 22–29)
CREAT SERPL-MCNC: 0.39 MG/DL (ref 0.57–1)
CREAT SERPL-MCNC: 0.39 MG/DL (ref 0.57–1)
DEPRECATED RDW RBC AUTO: 49.3 FL (ref 37–54)
EGFRCR SERPLBLD CKD-EPI 2021: 137.6 ML/MIN/1.73
EOSINOPHIL # BLD AUTO: 0.01 10*3/MM3 (ref 0–0.4)
EOSINOPHIL NFR BLD AUTO: 0.1 % (ref 0.3–6.2)
ERYTHROCYTE [DISTWIDTH] IN BLOOD BY AUTOMATED COUNT: 13.8 % (ref 12.3–15.4)
GLUCOSE SERPL-MCNC: 93 MG/DL (ref 65–99)
HCT VFR BLD AUTO: 31.6 % (ref 34–46.6)
HGB BLD-MCNC: 10.2 G/DL (ref 12–15.9)
IMM GRANULOCYTES # BLD AUTO: 0.05 10*3/MM3 (ref 0–0.05)
IMM GRANULOCYTES NFR BLD AUTO: 0.5 % (ref 0–0.5)
LDH SERPL-CCNC: 145 U/L (ref 135–214)
LYMPHOCYTES # BLD AUTO: 1.77 10*3/MM3 (ref 0.7–3.1)
LYMPHOCYTES NFR BLD AUTO: 17 % (ref 19.6–45.3)
MCH RBC QN AUTO: 31.3 PG (ref 26.6–33)
MCHC RBC AUTO-ENTMCNC: 32.3 G/DL (ref 31.5–35.7)
MCV RBC AUTO: 96.9 FL (ref 79–97)
MONOCYTES # BLD AUTO: 0.62 10*3/MM3 (ref 0.1–0.9)
MONOCYTES NFR BLD AUTO: 6 % (ref 5–12)
NEUTROPHILS NFR BLD AUTO: 7.93 10*3/MM3 (ref 1.7–7)
NEUTROPHILS NFR BLD AUTO: 76.2 % (ref 42.7–76)
NRBC BLD AUTO-RTO: 0 /100 WBC (ref 0–0.2)
PLATELET # BLD AUTO: 215 10*3/MM3 (ref 140–450)
PMV BLD AUTO: 11.3 FL (ref 6–12)
POTASSIUM SERPL-SCNC: 4 MMOL/L (ref 3.5–5.2)
RBC # BLD AUTO: 3.26 10*6/MM3 (ref 3.77–5.28)
SODIUM SERPL-SCNC: 137 MMOL/L (ref 136–145)
URATE SERPL-MCNC: 3.5 MG/DL (ref 2.4–5.7)
WBC NRBC COR # BLD AUTO: 10.4 10*3/MM3 (ref 3.4–10.8)

## 2024-02-16 PROCEDURE — 25010000002 RHO D IMMUNE GLOBULIN 1500 UNIT/2ML SOLUTION PREFILLED SYRINGE: Performed by: OBSTETRICS & GYNECOLOGY

## 2024-02-16 PROCEDURE — 84450 TRANSFERASE (AST) (SGOT): CPT | Performed by: OBSTETRICS & GYNECOLOGY

## 2024-02-16 PROCEDURE — 25810000003 LACTATED RINGERS SOLUTION: Performed by: OBSTETRICS & GYNECOLOGY

## 2024-02-16 PROCEDURE — 82565 ASSAY OF CREATININE: CPT | Performed by: OBSTETRICS & GYNECOLOGY

## 2024-02-16 PROCEDURE — 25010000002 KETOROLAC TROMETHAMINE PER 15 MG: Performed by: OBSTETRICS & GYNECOLOGY

## 2024-02-16 PROCEDURE — 83615 LACTATE (LD) (LDH) ENZYME: CPT | Performed by: OBSTETRICS & GYNECOLOGY

## 2024-02-16 PROCEDURE — 82247 BILIRUBIN TOTAL: CPT | Performed by: OBSTETRICS & GYNECOLOGY

## 2024-02-16 PROCEDURE — 85025 COMPLETE CBC W/AUTO DIFF WBC: CPT | Performed by: OBSTETRICS & GYNECOLOGY

## 2024-02-16 PROCEDURE — 25010000002 ENOXAPARIN PER 10 MG: Performed by: OBSTETRICS & GYNECOLOGY

## 2024-02-16 PROCEDURE — 84460 ALANINE AMINO (ALT) (SGPT): CPT | Performed by: OBSTETRICS & GYNECOLOGY

## 2024-02-16 PROCEDURE — 80048 BASIC METABOLIC PNL TOTAL CA: CPT | Performed by: OBSTETRICS & GYNECOLOGY

## 2024-02-16 PROCEDURE — 84075 ASSAY ALKALINE PHOSPHATASE: CPT | Performed by: OBSTETRICS & GYNECOLOGY

## 2024-02-16 PROCEDURE — 84550 ASSAY OF BLOOD/URIC ACID: CPT | Performed by: OBSTETRICS & GYNECOLOGY

## 2024-02-16 RX ADMIN — LABETALOL HYDROCHLORIDE 100 MG: 100 TABLET, FILM COATED ORAL at 20:57

## 2024-02-16 RX ADMIN — SODIUM CHLORIDE, POTASSIUM CHLORIDE, SODIUM LACTATE AND CALCIUM CHLORIDE 1000 ML: 600; 310; 30; 20 INJECTION, SOLUTION INTRAVENOUS at 00:30

## 2024-02-16 RX ADMIN — METRONIDAZOLE 500 MG: 500 TABLET ORAL at 20:57

## 2024-02-16 RX ADMIN — SODIUM CHLORIDE, POTASSIUM CHLORIDE, SODIUM LACTATE AND CALCIUM CHLORIDE 1000 ML: 600; 310; 30; 20 INJECTION, SOLUTION INTRAVENOUS at 04:22

## 2024-02-16 RX ADMIN — IBUPROFEN 600 MG: 600 TABLET, FILM COATED ORAL at 17:03

## 2024-02-16 RX ADMIN — METRONIDAZOLE 500 MG: 500 TABLET ORAL at 08:07

## 2024-02-16 RX ADMIN — ACETAMINOPHEN 1000 MG: 325 TABLET ORAL at 00:49

## 2024-02-16 RX ADMIN — OXYCODONE HYDROCHLORIDE 5 MG: 5 TABLET ORAL at 11:12

## 2024-02-16 RX ADMIN — KETOROLAC TROMETHAMINE 15 MG: 15 INJECTION, SOLUTION INTRAMUSCULAR; INTRAVENOUS at 11:11

## 2024-02-16 RX ADMIN — KETOROLAC TROMETHAMINE 15 MG: 15 INJECTION, SOLUTION INTRAMUSCULAR; INTRAVENOUS at 00:49

## 2024-02-16 RX ADMIN — ACETAMINOPHEN 650 MG: 325 TABLET ORAL at 12:49

## 2024-02-16 RX ADMIN — ACETAMINOPHEN 1000 MG: 325 TABLET ORAL at 08:06

## 2024-02-16 RX ADMIN — HYDROCODONE BITARTRATE AND ACETAMINOPHEN 1 TABLET: 10; 325 TABLET ORAL at 05:54

## 2024-02-16 RX ADMIN — ACETAMINOPHEN 650 MG: 325 TABLET ORAL at 19:45

## 2024-02-16 RX ADMIN — HUMAN RHO(D) IMMUNE GLOBULIN 1500 UNITS: 1500 SOLUTION INTRAMUSCULAR; INTRAVENOUS at 10:29

## 2024-02-16 RX ADMIN — LABETALOL HYDROCHLORIDE 100 MG: 100 TABLET, FILM COATED ORAL at 08:07

## 2024-02-16 RX ADMIN — OXYCODONE HYDROCHLORIDE 5 MG: 5 TABLET ORAL at 17:03

## 2024-02-16 RX ADMIN — OXYCODONE HYDROCHLORIDE 5 MG: 5 TABLET ORAL at 22:13

## 2024-02-16 RX ADMIN — KETOROLAC TROMETHAMINE 15 MG: 15 INJECTION, SOLUTION INTRAMUSCULAR; INTRAVENOUS at 05:54

## 2024-02-16 RX ADMIN — ENOXAPARIN SODIUM 40 MG: 100 INJECTION SUBCUTANEOUS at 11:11

## 2024-02-16 NOTE — PROGRESS NOTES
2024    Name:Laura Riley    MR#:[id number]     PROGRESS NOTE:  Post-Op 1 S/P    HD:1    Subjective   29 y.o. yo Female  s/p CS at 38w0d doing well. Pain well controlled. Tolerating regular diet and having flatus. Lochia normal.       Chronic hypertension affecting pregnancy    Status post  section    Status post bilateral salpingectomy        Objective    Vitals  Temp:  Temp:  [97.5 °F (36.4 °C)-98.1 °F (36.7 °C)] 97.8 °F (36.6 °C)  Temp src: Oral  BP:  BP: (123-149)/(63-79) 135/79  Pulse:  Heart Rate:  [59-85] 85  RR:   Resp:  [14-16] 16    General Awake, alert, no distress  Abdomen Soft, non-distended, fundus firm, below umbilicus, appropriately tender  Incision  Intact, no erythema or exudate  Extremities Calves NT bilaterally     I/O last 3 completed shifts:  In: 800 [I.V.:800]  Out: 853 [Urine:645; Blood:208]    LABS:   Lab Results   Component Value Date    WBC 10.40 2024    HGB 10.2 (L) 2024    HCT 31.6 (L) 2024    MCV 96.9 2024     2024       PEP:   Lab Results   Component Value Date    ALKPHOS 93 2024    ALT 20 2024    AST 16 2024    CREATININE 0.39 (L) 2024    CREATININE 0.39 (L) 2024    BILITOT 0.3 2024     2024    URICACID 3.5 2024       Infant: female       Assessment   1.  POD 1  2.  CHTN on meds  3.  BUFA      Plan:    UOP adequate after boluses.  Hemodynamically stable.  Continue strict I/os but can remove watt    BUFA - doing well in this area well.      Active Problems:   None      Keturah Kern MD  2024 13:35 EST

## 2024-02-16 NOTE — CASE MANAGEMENT/SOCIAL WORK
Continued Stay Note  [facility]     Patient Name: Laura Riley  MRN: [id number]  Today's Date: 2/16/2024    Admit Date: 2/15/2024    Plan: MSW available   Discharge Plan       Row Name 02/16/24 1520       Plan    Plan MSW available    Plan Comments Visited pt. Discussed d/c. She states she plans to stay at least a day or two more. States she is fine with infant discharing home with adoptive parents tomorrow. She did meet with the adoptive parents today. MSW offered support    Final Discharge Disposition Code 01 - home or self-care                   Discharge Codes    No documentation.                       AMY Barney

## 2024-02-17 VITALS
SYSTOLIC BLOOD PRESSURE: 146 MMHG | TEMPERATURE: 98.1 F | WEIGHT: 292 LBS | OXYGEN SATURATION: 97 % | BODY MASS INDEX: 45.83 KG/M2 | DIASTOLIC BLOOD PRESSURE: 77 MMHG | HEART RATE: 86 BPM | RESPIRATION RATE: 16 BRPM | HEIGHT: 67 IN

## 2024-02-17 PROCEDURE — 25010000002 ENOXAPARIN PER 10 MG: Performed by: OBSTETRICS & GYNECOLOGY

## 2024-02-17 RX ORDER — POLYETHYLENE GLYCOL 3350 17 G/17G
17 POWDER, FOR SOLUTION ORAL DAILY
Qty: 238 G | Refills: 0 | Status: SHIPPED | OUTPATIENT
Start: 2024-02-17 | End: 2024-02-29

## 2024-02-17 RX ORDER — IBUPROFEN 600 MG/1
TABLET ORAL
Qty: 40 TABLET | Refills: 0 | Status: SHIPPED | OUTPATIENT
Start: 2024-02-17 | End: 2024-02-29

## 2024-02-17 RX ORDER — ONDANSETRON 4 MG/1
4 TABLET, ORALLY DISINTEGRATING ORAL EVERY 8 HOURS PRN
Qty: 10 TABLET | Refills: 0 | Status: SHIPPED | OUTPATIENT
Start: 2024-02-17 | End: 2024-02-29

## 2024-02-17 RX ORDER — OXYCODONE HYDROCHLORIDE 5 MG/1
5-10 TABLET ORAL EVERY 8 HOURS PRN
Qty: 10 TABLET | Refills: 0 | Status: SHIPPED | OUTPATIENT
Start: 2024-02-17 | End: 2024-02-22

## 2024-02-17 RX ORDER — ACETAMINOPHEN 500 MG
TABLET ORAL
Qty: 80 TABLET | Refills: 0 | Status: SHIPPED | OUTPATIENT
Start: 2024-02-17 | End: 2024-02-29

## 2024-02-17 RX ADMIN — OXYCODONE HYDROCHLORIDE 10 MG: 5 TABLET ORAL at 12:17

## 2024-02-17 RX ADMIN — IBUPROFEN 600 MG: 600 TABLET, FILM COATED ORAL at 06:07

## 2024-02-17 RX ADMIN — ENOXAPARIN SODIUM 40 MG: 100 INJECTION SUBCUTANEOUS at 00:21

## 2024-02-17 RX ADMIN — OXYCODONE HYDROCHLORIDE 10 MG: 5 TABLET ORAL at 08:36

## 2024-02-17 RX ADMIN — ACETAMINOPHEN 650 MG: 325 TABLET ORAL at 07:41

## 2024-02-17 RX ADMIN — IBUPROFEN 600 MG: 600 TABLET, FILM COATED ORAL at 11:19

## 2024-02-17 RX ADMIN — ACETAMINOPHEN 650 MG: 325 TABLET ORAL at 12:18

## 2024-02-17 RX ADMIN — METRONIDAZOLE 500 MG: 500 TABLET ORAL at 08:37

## 2024-02-17 RX ADMIN — IBUPROFEN 600 MG: 600 TABLET, FILM COATED ORAL at 00:21

## 2024-02-17 RX ADMIN — LABETALOL HYDROCHLORIDE 100 MG: 100 TABLET, FILM COATED ORAL at 08:36

## 2024-02-17 NOTE — NURSING NOTE
Patient given discharge instructions. No questions or concerns at this time. Patient awaiting meds to beds

## 2024-02-17 NOTE — DISCHARGE SUMMARY
Discharge Summary    Date of Admission: 2/15/2024  Date of Discharge:  2024      Patient: Laura Riley      MR#:[id number]    Delivery Provider: Keturah Kern     Discharge Surgeon/OB: Chay Clifford MD    Presenting Problem/History of Present Illness  Chronic hypertension affecting pregnancy [O10.919]       Chronic hypertension affecting pregnancy    Status post  section    Status post bilateral salpingectomy         Discharge Diagnosis: Vaginal delivery at 38w0d    Procedures:  , Low Transverse     2/15/2024    10:28 AM        Discharge Date: 2024;     Hospital Course  Patient is a 29 y.o. female  at 38w0d status post CS without complication. Postpartum the patient did well. She remained afebrile, with vital signs stable. She was ready for discharge on postpartum day 2.     Infant:   female  fetus 3280 g (7 lb 3.7 oz)  with Apgar scores of 8 , 9  at five minutes.    Condition on Discharge:  Stable    Vital Signs  Temp:  [97.8 °F (36.6 °C)-98.3 °F (36.8 °C)] 98.1 °F (36.7 °C)  Heart Rate:  [66-87] 86  Resp:  [14-16] 16  BP: (129-146)/(61-79) 146/77    Lab Results   Component Value Date    WBC 10.40 2024    HGB 10.2 (L) 2024    HCT 31.6 (L) 2024    MCV 96.9 2024     2024       Discharge Disposition  Home or Self Care    Discharge Medications     Discharge Medications        New Medications        Instructions Start Date   acetaminophen 500 MG tablet  Commonly known as: TYLENOL   Take 2 tablets every 6 hours for 2 days. Afterwards, take 2 tablets every 6 hours as needed.      ibuprofen 600 MG tablet  Commonly known as: ADVIL,MOTRIN   Take 1 tablet every 6 hours for 2 days. Afterwards, take 1 tablet every 6 hours as needed.      oxyCODONE 5 MG immediate release tablet  Commonly known as: Roxicodone   5-10 mg, Oral, Every 8 Hours PRN      polyethylene glycol 17 GM/SCOOP powder  Commonly known as: MiraLax   17 g, Oral, Daily, Dissolve in large  glass of water and take daily             Changes to Medications        Instructions Start Date   ondansetron ODT 4 MG disintegrating tablet  Commonly known as: ZOFRAN-ODT  What changed: Another medication with the same name was added. Make sure you understand how and when to take each.   4 mg, Translingual, Every 8 Hours PRN      ondansetron ODT 4 MG disintegrating tablet  Commonly known as: ZOFRAN-ODT  What changed: You were already taking a medication with the same name, and this prescription was added. Make sure you understand how and when to take each.   4 mg, Translingual, Every 8 Hours PRN             Continue These Medications        Instructions Start Date   Flinstones Gummies Omega-3 DHA chewable tablet   1 tablet, Oral, Daily      labetalol 100 MG tablet  Commonly known as: NORMODYNE   100 mg, Oral, 2 Times Daily      metroNIDAZOLE 500 MG tablet  Commonly known as: Flagyl   500 mg, Oral, 2 Times Daily               Discharge Diet:     Activity at Discharge:     Follow-up Appointments  No future appointments.      Chay Clifford MD  02/17/24  11:09 EST  Csd

## 2024-02-19 LAB
CYTO UR: NORMAL
LAB AP CASE REPORT: NORMAL
LAB AP CLINICAL INFORMATION: NORMAL
PATH REPORT.FINAL DX SPEC: NORMAL
PATH REPORT.GROSS SPEC: NORMAL

## 2024-02-29 ENCOUNTER — POSTPARTUM VISIT (OUTPATIENT)
Dept: OBSTETRICS AND GYNECOLOGY | Facility: CLINIC | Age: 29
End: 2024-02-29
Payer: COMMERCIAL

## 2024-02-29 VITALS
SYSTOLIC BLOOD PRESSURE: 124 MMHG | BODY MASS INDEX: 42.6 KG/M2 | WEIGHT: 271.4 LBS | DIASTOLIC BLOOD PRESSURE: 82 MMHG | HEIGHT: 67 IN

## 2024-02-29 DIAGNOSIS — Z90.79 STATUS POST BILATERAL SALPINGECTOMY: Primary | ICD-10-CM

## 2024-02-29 DIAGNOSIS — I10 CHRONIC HYPERTENSION: ICD-10-CM

## 2024-02-29 NOTE — PROGRESS NOTES
"      Chief Complaint   Patient presents with    Postpartum Care     2 wk pp check   [facility]    Postpartum Visit         Laura Riley is a 29 y.o.  who presents today for a 14 day(s) postpartum check.      C/S: breech     , Low Transverse  with bilateral salpingectomy  Information for the patient's :  Aileen Riley [[id number]]   2/15/2025   female   Aileen Riley   3280 g (7 lb 3.7 oz)   Gestational Age: 38w0d  --- [facility]    Baby  discharged with adoptive parents  Delivering MD: Keturah Kern MD.    Pregnancy complications: chronic HTN. Pt is not monitoring her BP at home.     Patient reports her incision is clean, dry, intact. Patient describes vaginal bleeding as light. She would like to discuss the following complaints today: none.    Pt is s/p bilateral salpingectomy.     Patient denies concerns for postpartum depression/anxiety. Patient denies  suicidal or homicidal ideation. Her postpartum depression screening questionnaire: 1. No treatment is indicated      The additional following portions of the patient's history were reviewed and updated as appropriate: allergies, current medications, past family history, past medical history, past social history, past surgical history, and problem list.      Review of Systems   Genitourinary:  Positive for vaginal bleeding.   Skin:  Positive for wound.       I have reviewed and agree with the HPI, ROS, and historical information as entered above. Tootie Pablo, APRN      Objective   /82   Ht 170.2 cm (67\")   Wt 123 kg (271 lb 6.4 oz)   LMP 2023   Breastfeeding No   BMI 42.51 kg/m²     Physical Exam  Vitals and nursing note reviewed.   Constitutional:       General: She is not in acute distress.     Appearance: Normal appearance. She is not ill-appearing.   Pulmonary:      Effort: Pulmonary effort is normal. No respiratory distress.   Skin:     General: Skin is warm and dry.   Neurological:      Mental Status: She is alert " and oriented to person, place, and time.   Psychiatric:         Mood and Affect: Mood normal.         Behavior: Behavior normal.              Assessment and Plan    Problem List Items Addressed This Visit          Cardiac and Vasculature    Chronic hypertension    Overview     NOB-Start Labetalol 100mg BID  Baseline PEP  24 hour urine (pt did not do)         Relevant Medications    labetalol (NORMODYNE) 100 MG tablet       Genitourinary and Reproductive     Status post bilateral salpingectomy - Primary     Other Visit Diagnoses       Postpartum follow-up                S/p , 2 week(s) postpartum.  Doing well.    Continued pelvic rest with a return to driving and light physical activity.  Baby doing well.  No si/sx of postpartum depression  Contraception: s/p bilateral salpingectomy  Return in about 4 weeks (around 3/28/2024).    Tootie Pablo, APRN  2024

## 2024-03-26 ENCOUNTER — POSTPARTUM VISIT (OUTPATIENT)
Dept: OBSTETRICS AND GYNECOLOGY | Facility: CLINIC | Age: 29
End: 2024-03-26
Payer: COMMERCIAL

## 2024-03-26 VITALS — SYSTOLIC BLOOD PRESSURE: 124 MMHG | DIASTOLIC BLOOD PRESSURE: 84 MMHG | BODY MASS INDEX: 44.01 KG/M2 | WEIGHT: 281 LBS

## 2024-03-26 DIAGNOSIS — I10 CHRONIC HYPERTENSION: Primary | ICD-10-CM

## 2024-03-26 DIAGNOSIS — Z90.79 STATUS POST BILATERAL SALPINGECTOMY: ICD-10-CM

## 2024-03-26 DIAGNOSIS — E66.01 MORBID OBESITY WITH BMI OF 45.0-49.9, ADULT: ICD-10-CM

## 2024-03-26 DIAGNOSIS — Z98.891 STATUS POST CESAREAN SECTION: ICD-10-CM

## 2024-03-26 PROCEDURE — 0503F POSTPARTUM CARE VISIT: CPT | Performed by: NURSE PRACTITIONER

## 2024-03-26 RX ORDER — LABETALOL 100 MG/1
100 TABLET, FILM COATED ORAL 2 TIMES DAILY
Qty: 60 TABLET | Refills: 3 | Status: SHIPPED | OUTPATIENT
Start: 2024-03-26

## 2024-03-26 NOTE — PROGRESS NOTES
Chief Complaint   Patient presents with    Postpartum Care       Postpartum Visit         Laura Riley is a 29 y.o.  who presents today for a 6 week(s) postpartum check.     C/S: breech and fetal intolerance    , Low Transverse    Information for the patient's :  Aileen Riley [[id number]]   2[birth year]15/2025   female   Aileen Riley   3280 g (7 lb 3.7 oz)   Gestational Age: 38w0d        Baby Discharged:  Discharged with adoptive parents  Delivering Physician: Keutrah Kern MD    Her pregnancy was complicated by chronic HTN. The incision is healing well. Patient describes vaginal bleeding as light.  Patient had Bilateral salpingectomy during  surgery.    She would like to discuss the following complaints today: Patient is requesting a refill for her labetalol.    Patient denies concerns for postpartum depression/anxiety. Patient denies  suicidal or homicidal ideation. Her postpartum depression screening questionnaire: 0. No treatment is indicated      Last Pap : 10/26/23. Results: negative. HPV: negative. Positive for Trich  Last Completed Pap Smear            PAP SMEAR (Every 3 Years) Next due on 10/26/2026      10/26/2023  LIQUID-BASED PAP SMEAR WITH HPV GENOTYPING REGARDLESS OF INTERPRETATION ([facility])                      The additional following portions of the patient's history were reviewed and updated as appropriate: allergies and current medications.    Review of Systems   Genitourinary:  Positive for vaginal bleeding.   All other systems reviewed and are negative.    All other systems reviewed and are negative.     I have reviewed and agree with the HPI, ROS, and historical information as entered above. Tootie Moralesf, APRN      /84   Wt 127 kg (281 lb)   LMP 2023   Breastfeeding No   BMI 44.01 kg/m²     Physical Exam  Vitals and nursing note reviewed.   Constitutional:       General: She is not in acute distress.     Appearance: Normal  appearance. She is obese. She is not ill-appearing.   Pulmonary:      Effort: Pulmonary effort is normal. No respiratory distress.   Abdominal:      General: There is no distension.      Palpations: Abdomen is soft. There is no mass.      Tenderness: There is no abdominal tenderness. There is no guarding or rebound.      Hernia: No hernia is present.      Comments: Incision well healed.  Well approximated without redness or drainage.   Skin:     General: Skin is warm and dry.   Neurological:      Mental Status: She is alert and oriented to person, place, and time.   Psychiatric:         Mood and Affect: Mood normal.         Behavior: Behavior normal.             Assessment and Plan    Problem List Items Addressed This Visit          Cardiac and Vasculature    Chronic hypertension - Primary    Overview     NOB-Start Labetalol 100mg BID  Baseline PEP  24 hour urine (pt did not do)         Relevant Medications    labetalol (NORMODYNE) 100 MG tablet       Endocrine and Metabolic    Morbid obesity with BMI of 45.0-49.9, adult       Genitourinary and Reproductive     Status post bilateral salpingectomy       Gravid and     Status post  section     Other Visit Diagnoses       Postpartum follow-up                S/p , 6 week(s) postpartum.  Doing well.    Return to normal physical activity.  No pelvic restrictions.   No si/sx of postpartum depression  Contraception:   s/p bilateral salpingectomy  RTO one year.   Labetalol refilled.  FU with PCP for continued management of CHTN.    Tootie Pablo, APRN  2024

## 2024-06-06 ENCOUNTER — OFFICE VISIT (OUTPATIENT)
Dept: INTERNAL MEDICINE | Facility: CLINIC | Age: 29
End: 2024-06-06
Payer: COMMERCIAL

## 2024-06-06 ENCOUNTER — LAB (OUTPATIENT)
Dept: INTERNAL MEDICINE | Facility: CLINIC | Age: 29
End: 2024-06-06
Payer: COMMERCIAL

## 2024-06-06 VITALS
TEMPERATURE: 96.6 F | BODY MASS INDEX: 44.45 KG/M2 | DIASTOLIC BLOOD PRESSURE: 78 MMHG | SYSTOLIC BLOOD PRESSURE: 140 MMHG | HEART RATE: 93 BPM | OXYGEN SATURATION: 97 % | HEIGHT: 67 IN | WEIGHT: 283.2 LBS

## 2024-06-06 DIAGNOSIS — E66.01 CLASS 3 SEVERE OBESITY DUE TO EXCESS CALORIES WITHOUT SERIOUS COMORBIDITY WITH BODY MASS INDEX (BMI) OF 40.0 TO 44.9 IN ADULT: Primary | ICD-10-CM

## 2024-06-06 DIAGNOSIS — E66.01 MORBID (SEVERE) OBESITY DUE TO EXCESS CALORIES: ICD-10-CM

## 2024-06-06 DIAGNOSIS — I10 HYPERTENSION, UNSPECIFIED TYPE: ICD-10-CM

## 2024-06-06 LAB
ALBUMIN SERPL-MCNC: 4.4 G/DL (ref 3.5–5.2)
ALBUMIN/GLOB SERPL: 1.4 G/DL
ALP SERPL-CCNC: 73 U/L (ref 39–117)
ALT SERPL W P-5'-P-CCNC: 29 U/L (ref 1–33)
ANION GAP SERPL CALCULATED.3IONS-SCNC: 9.8 MMOL/L (ref 5–15)
AST SERPL-CCNC: 22 U/L (ref 1–32)
BILIRUB SERPL-MCNC: 0.3 MG/DL (ref 0–1.2)
BUN SERPL-MCNC: 13 MG/DL (ref 6–20)
BUN/CREAT SERPL: 19.1 (ref 7–25)
CALCIUM SPEC-SCNC: 9.3 MG/DL (ref 8.6–10.5)
CHLORIDE SERPL-SCNC: 102 MMOL/L (ref 98–107)
CHOLEST SERPL-MCNC: 165 MG/DL (ref 0–200)
CO2 SERPL-SCNC: 26.2 MMOL/L (ref 22–29)
CREAT SERPL-MCNC: 0.68 MG/DL (ref 0.57–1)
DEPRECATED RDW RBC AUTO: 45 FL (ref 37–54)
EGFRCR SERPLBLD CKD-EPI 2021: 120.3 ML/MIN/1.73
ERYTHROCYTE [DISTWIDTH] IN BLOOD BY AUTOMATED COUNT: 13.1 % (ref 12.3–15.4)
GLOBULIN UR ELPH-MCNC: 3.1 GM/DL
GLUCOSE SERPL-MCNC: 82 MG/DL (ref 65–99)
HBA1C MFR BLD: 5.3 % (ref 4.8–5.6)
HCT VFR BLD AUTO: 39.5 % (ref 34–46.6)
HDLC SERPL-MCNC: 62 MG/DL (ref 40–60)
HGB BLD-MCNC: 12.9 G/DL (ref 12–15.9)
LDLC SERPL CALC-MCNC: 71 MG/DL (ref 0–100)
LDLC/HDLC SERPL: 1.03 {RATIO}
MCH RBC QN AUTO: 30.5 PG (ref 26.6–33)
MCHC RBC AUTO-ENTMCNC: 32.7 G/DL (ref 31.5–35.7)
MCV RBC AUTO: 93.4 FL (ref 79–97)
PLATELET # BLD AUTO: 326 10*3/MM3 (ref 140–450)
PMV BLD AUTO: 11.7 FL (ref 6–12)
POTASSIUM SERPL-SCNC: 4.6 MMOL/L (ref 3.5–5.2)
PROT SERPL-MCNC: 7.5 G/DL (ref 6–8.5)
RBC # BLD AUTO: 4.23 10*6/MM3 (ref 3.77–5.28)
SODIUM SERPL-SCNC: 138 MMOL/L (ref 136–145)
TRIGL SERPL-MCNC: 196 MG/DL (ref 0–150)
TSH SERPL DL<=0.05 MIU/L-ACNC: 2.04 UIU/ML (ref 0.27–4.2)
VLDLC SERPL-MCNC: 32 MG/DL (ref 5–40)
WBC NRBC COR # BLD AUTO: 8.99 10*3/MM3 (ref 3.4–10.8)

## 2024-06-06 PROCEDURE — 80061 LIPID PANEL: CPT | Performed by: STUDENT IN AN ORGANIZED HEALTH CARE EDUCATION/TRAINING PROGRAM

## 2024-06-06 PROCEDURE — 80053 COMPREHEN METABOLIC PANEL: CPT | Performed by: STUDENT IN AN ORGANIZED HEALTH CARE EDUCATION/TRAINING PROGRAM

## 2024-06-06 PROCEDURE — 99214 OFFICE O/P EST MOD 30 MIN: CPT | Performed by: STUDENT IN AN ORGANIZED HEALTH CARE EDUCATION/TRAINING PROGRAM

## 2024-06-06 PROCEDURE — 84443 ASSAY THYROID STIM HORMONE: CPT | Performed by: STUDENT IN AN ORGANIZED HEALTH CARE EDUCATION/TRAINING PROGRAM

## 2024-06-06 PROCEDURE — 3078F DIAST BP <80 MM HG: CPT | Performed by: STUDENT IN AN ORGANIZED HEALTH CARE EDUCATION/TRAINING PROGRAM

## 2024-06-06 PROCEDURE — 83036 HEMOGLOBIN GLYCOSYLATED A1C: CPT | Performed by: STUDENT IN AN ORGANIZED HEALTH CARE EDUCATION/TRAINING PROGRAM

## 2024-06-06 PROCEDURE — 36415 COLL VENOUS BLD VENIPUNCTURE: CPT | Performed by: STUDENT IN AN ORGANIZED HEALTH CARE EDUCATION/TRAINING PROGRAM

## 2024-06-06 PROCEDURE — 1160F RVW MEDS BY RX/DR IN RCRD: CPT | Performed by: STUDENT IN AN ORGANIZED HEALTH CARE EDUCATION/TRAINING PROGRAM

## 2024-06-06 PROCEDURE — 1159F MED LIST DOCD IN RCRD: CPT | Performed by: STUDENT IN AN ORGANIZED HEALTH CARE EDUCATION/TRAINING PROGRAM

## 2024-06-06 PROCEDURE — 3077F SYST BP >= 140 MM HG: CPT | Performed by: STUDENT IN AN ORGANIZED HEALTH CARE EDUCATION/TRAINING PROGRAM

## 2024-06-06 PROCEDURE — 85027 COMPLETE CBC AUTOMATED: CPT | Performed by: STUDENT IN AN ORGANIZED HEALTH CARE EDUCATION/TRAINING PROGRAM

## 2024-06-06 RX ORDER — LABETALOL 100 MG/1
100 TABLET, FILM COATED ORAL 2 TIMES DAILY
Qty: 60 TABLET | Refills: 3 | Status: SHIPPED | OUTPATIENT
Start: 2024-06-06

## 2024-06-06 NOTE — PROGRESS NOTES
Office Note     Name: Laura Riley    : 1995     MRN: [id number]     Chief Complaint  Hypertension    Subjective     History of Present Illness:  Laura Riley is a 29 y.o. female who presents today for     Hypertension, started during pregnancy, her ob.gyn started her on labetalol.   She has been doing well on the medication.     Weight management  Has been doing calorie restricted diet and exercise since giving birth in Feb, still having difficulties with weight  Current weight 283lbs.      Review of Systems:   Review of Systems   All other systems reviewed and are negative.      Past Medical History:   Past Medical History:   Diagnosis Date    Chronic hypertension affecting pregnancy 2023    placed on labetalol bid       Past Surgical History:   Past Surgical History:   Procedure Laterality Date     SECTION N/A 2/15/2024    Procedure:  SECTION PRIMARY;  Surgeon: Keturah Kern MD;  Location: [facility] LABOR DELIVERY;  Service: Obstetrics/Gynecology;  Laterality: N/A;    LAPAROSCOPIC CHOLECYSTECTOMY      WISDOM TOOTH EXTRACTION         Family History:   Family History   Problem Relation Age of Onset    Hypertension Mother     Breast cancer Neg Hx     Colon cancer Neg Hx     Ovarian cancer Neg Hx     Uterine cancer Neg Hx        Social History:   Social History     Socioeconomic History    Marital status: Single   Tobacco Use    Smoking status: Never    Smokeless tobacco: Never   Vaping Use    Vaping status: Never Used   Substance and Sexual Activity    Alcohol use: Not Currently    Drug use: Never    Sexual activity: Not Currently       Immunizations:   Immunization History   Administered Date(s) Administered    COVID-19 (MODERNA) 1st,2nd,3rd Dose Monovalent 2021, 10/19/2021    PPD Test 04/10/2017    Rho (D) Immune Globulin 2019, 2023, 2024        Medications:     Current Outpatient Medications:     labetalol (NORMODYNE) 100 MG tablet, Take 1 tablet by  "mouth 2 (Two) Times a Day., Disp: 60 tablet, Rfl: 3    phentermine (Adipex-P) 37.5 MG tablet, Take 1 tablet by mouth Every Morning Before Breakfast., Disp: 90 tablet, Rfl: 0    Allergies:   Allergies   Allergen Reactions    Bactrim [Sulfamethoxazole-Trimethoprim] Hives       Objective     Vital Signs  /78   Pulse 93   Temp 96.6 °F (35.9 °C) (Temporal)   Ht 170.2 cm (67.01\")   Wt 128 kg (283 lb 3.2 oz)   SpO2 97%   BMI 44.34 kg/m²   Estimated body mass index is 44.34 kg/m² as calculated from the following:    Height as of this encounter: 170.2 cm (67.01\").    Weight as of this encounter: 128 kg (283 lb 3.2 oz).          Physical Exam  Constitutional:       Appearance: Normal appearance. She is obese.   Cardiovascular:      Rate and Rhythm: Normal rate and regular rhythm.      Pulses: Normal pulses.      Heart sounds: Normal heart sounds.   Pulmonary:      Effort: Pulmonary effort is normal.      Breath sounds: Normal breath sounds.   Neurological:      Mental Status: She is alert.          Result Review :     Common labs          2/15/2024    08:07 2/16/2024    06:58 6/6/2024    11:52   Common Labs   Glucose  93  82    BUN  5  13    Creatinine  0.39     0.39  0.68    Sodium  137  138    Potassium  4.0  4.6    Chloride  105  102    Calcium  8.2  9.3    Albumin   4.4    Total Bilirubin  0.3  0.3    Alkaline Phosphatase  93  73    AST (SGOT)  16  22    ALT (SGPT)  20  29    WBC 8.94  10.40  8.99    Hemoglobin 11.7  10.2  12.9    Hematocrit 34.2  31.6  39.5    Platelets 226  215  326    Total Cholesterol   165    Triglycerides   196    HDL Cholesterol   62    LDL Cholesterol    71    Hemoglobin A1C   5.30    Uric Acid  3.5                    Assessment and Plan     1. Hypertension, unspecified type  Controlled  Continue with labetalol  - labetalol (NORMODYNE) 100 MG tablet; Take 1 tablet by mouth 2 (Two) Times a Day.  Dispense: 60 tablet; Refill: 3  - TSH Rfx On Abnormal To Free T4; Future  - Comprehensive " metabolic panel; Future    2. Class 3 severe obesity due to excess calories without serious comorbidity with body mass index (BMI) of 40.0 to 44.9 in adult    Discussed risk associated with obesity. she was given instructions on calorie counting as well as on decreasing carbohydrate intake. she was also encouraged to start exercise regimen.  Instructed patient to download fitness magdalena on her smart phone to aid in calorie counting and exercise tracking.      - Ambulatory Referral to Nutrition Services  - Hemoglobin A1c; Future  - CBC No Differential; Future  - Lipid panel; Future  - phentermine (Adipex-P) 37.5 MG tablet; Take 1 tablet by mouth Every Morning Before Breakfast.  Dispense: 90 tablet; Refill: 0       Follow Up  Return in about 3 months (around 9/6/2024).    Osman Hayward MD  [facility] [address]  [facility]  [address]  [address] [address]  [phone]

## 2024-06-07 ENCOUNTER — TELEPHONE (OUTPATIENT)
Dept: INTERNAL MEDICINE | Facility: CLINIC | Age: 29
End: 2024-06-07
Payer: COMMERCIAL

## 2024-06-07 NOTE — TELEPHONE ENCOUNTER
Left message on voicemail letting patient know to call her insurance and check what medication is covered.  Some insurance do not cover any weight loss medication.

## 2024-06-07 NOTE — TELEPHONE ENCOUNTER
Caller: Laura Riley    Relationship: Self    Best call back number: [phone]     What was the call regarding: PATIENT WOULD LIKE AN UPDATE ON WEIGHT LOSS MEDICATION, PLEASE ADVISE.     Is it okay if the provider responds through [facility]: YES

## 2024-06-10 NOTE — TELEPHONE ENCOUNTER
Spoke with patient and let her know to ask about:  wegovy, zepbound, contrave or phentermine.  She will check with her insurance.

## 2024-06-10 NOTE — TELEPHONE ENCOUNTER
Name: Laura Riley      Relationship: Self      Best Callback Number: [phone]      HUB PROVIDED THE RELAY MESSAGE FROM THE OFFICE      PATIENT: HAS FURTHER QUESTIONS AND WOULD LIKE A CALL BACK AT THE FOLLOWING PHONE [phone]    ADDITIONAL INFORMATION: PATIENT ATTEMPTED TO CALL HER INSURANCE COMPANY AND THEY WILL NOT DISCLOSE THIS INFORMATION. THEY ARE REQUESTING INFORMATION FROM HER PCP ABOUT WHAT NAMES OF MEDICATIONS THEY WANT TO CHECK ON. PLEASE CALL TO DISCUSS WITH PATIENT.

## 2024-06-10 NOTE — TELEPHONE ENCOUNTER
Left another message asking patient to check with her insurance.  I asked her to call the office and let me know what she has found out from her insurance.    HUB to relay:  Please find out what information the patient has found out from her insurance.

## 2024-06-22 RX ORDER — PHENTERMINE HYDROCHLORIDE 37.5 MG/1
37.5 TABLET ORAL
Qty: 90 TABLET | Refills: 0 | Status: SHIPPED | OUTPATIENT
Start: 2024-06-22

## 2025-04-03 ENCOUNTER — OFFICE VISIT (OUTPATIENT)
Dept: BEHAVIORAL HEALTH | Facility: CLINIC | Age: 30
End: 2025-04-03
Payer: COMMERCIAL

## 2025-04-03 ENCOUNTER — OFFICE VISIT (OUTPATIENT)
Dept: BARIATRICS/WEIGHT MGMT | Facility: CLINIC | Age: 30
End: 2025-04-03
Payer: COMMERCIAL

## 2025-04-03 ENCOUNTER — DOCUMENTATION (OUTPATIENT)
Dept: BARIATRICS/WEIGHT MGMT | Facility: CLINIC | Age: 30
End: 2025-04-03
Payer: COMMERCIAL

## 2025-04-03 VITALS
SYSTOLIC BLOOD PRESSURE: 122 MMHG | DIASTOLIC BLOOD PRESSURE: 74 MMHG | RESPIRATION RATE: 18 BRPM | TEMPERATURE: 97.8 F | OXYGEN SATURATION: 98 % | HEIGHT: 68 IN | BODY MASS INDEX: 44.41 KG/M2 | WEIGHT: 293 LBS | HEART RATE: 84 BPM

## 2025-04-03 DIAGNOSIS — E66.01 MORBID OBESITY WITH BMI OF 45.0-49.9, ADULT: Primary | ICD-10-CM

## 2025-04-03 DIAGNOSIS — R53.83 OTHER FATIGUE: ICD-10-CM

## 2025-04-03 DIAGNOSIS — R10.13 DYSPEPSIA: ICD-10-CM

## 2025-04-03 DIAGNOSIS — R53.83 FATIGUE, UNSPECIFIED TYPE: ICD-10-CM

## 2025-04-03 DIAGNOSIS — Z71.89 ENCOUNTER FOR PSYCHOLOGICAL ASSESSMENT PRIOR TO BARIATRIC SURGERY: ICD-10-CM

## 2025-04-03 DIAGNOSIS — I10 ESSENTIAL HYPERTENSION: ICD-10-CM

## 2025-04-03 PROCEDURE — 99204 OFFICE O/P NEW MOD 45 MIN: CPT | Performed by: PHYSICIAN ASSISTANT

## 2025-04-03 PROCEDURE — 3074F SYST BP LT 130 MM HG: CPT | Performed by: PHYSICIAN ASSISTANT

## 2025-04-03 PROCEDURE — 1159F MED LIST DOCD IN RCRD: CPT | Performed by: PHYSICIAN ASSISTANT

## 2025-04-03 PROCEDURE — 90791 PSYCH DIAGNOSTIC EVALUATION: CPT | Performed by: PSYCHOLOGIST

## 2025-04-03 PROCEDURE — 3078F DIAST BP <80 MM HG: CPT | Performed by: PHYSICIAN ASSISTANT

## 2025-04-03 PROCEDURE — 1160F RVW MEDS BY RX/DR IN RCRD: CPT | Performed by: PHYSICIAN ASSISTANT

## 2025-04-03 NOTE — PROGRESS NOTES
"Weight Loss Surgery  Presurgical Nutrition Assessment     Laura Riley  04/03/2025  [id number]  [id number]  1995   female    Surgery desired: LSG (sleeve gastrectomy)     HEIGHT: 172.7 cm (68\")   WEIGHT: 139 kg (307 #)   BMI: 46.68    Highest weight ever:  unknown to dietitian      Allergies   Allergen Reactions    Bactrim [Sulfamethoxazole-Trimethoprim] Hives       Current Outpatient Medications:     labetalol (NORMODYNE) 100 MG tablet, Take 1 tablet by mouth 2 (Two) Times a Day., Disp: 60 tablet, Rfl: 3    phentermine (Adipex-P) 37.5 MG tablet, Take 1 tablet by mouth Every Morning Before Breakfast., Disp: 90 tablet, Rfl: 0      Subjective information: Nutrition consult with patient who had begun the process to have bariatric surgery at [facility] in fall, 2024.  She had several visits with Jyotsna Kilpatrick RD, and Elaine Nava RD there before opting to begin the process at this clinic.  She has never participated in a structured weight loss or healthful eating group.  She does not capably state principles of a healthy diet for weight loss. 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     Nutrition Recall   Example of Usual 24 hour intake:     Breakfast: negron, eggs, and toast OR cereal with milk    Lunch: chicken, mashed potatoes and green beans for example (from ).   Patient documented that she chooses \"bigger portion sizes.\"    Dinner: fast food 1 to 2 times daily usually,  stating that this is unhealthy food, often \"meal deals\"  such as burgers with fries and a fountain soda (Jennifer orange or Sprite regular).  Patient states that due to schedules in the evenings involving sports teams of her three children the family usually eats fast food meals.     Snacks:chips, cookies - various snack foods.  She sometimes snacks late in the evenings and and also throughout the night.     Beverages of Choice: 3 to 4 " " bottles of cold, plain or flavored water + \"lightly sweetened\" sweet tea throughout the day at work + 2 to 3 medium fast food Jennifer orange or Sprite sodas with fast food meals.   No coffee.    Food Allergies or Intolerances: none stated or recorded          Exercise / Activity: no personal planned exercise or activity program at this time.  She does, however[facility] [employer] [employer], but this is mostly for the childrens' sports.      Assessment of Nutritional Adequacy, Excessive Intake or Deficiencies:        Protein intake is insufficient to ensure successful weight loss.                                         Processed / simple Carbohydrate intake is: very excessive due to fast food sides such as fries as well as carbohydrate in cafeteria meals at [employer] where she works and eats from their menu.  Also, cookies, chips and other sweets in the evenings for snacks                                       Balance of diet with a variety of fruits and vegetables is: diet is unbalanced.  No fruits or vegetables, althoughbonnie does like veggies, she states.                                                        Reliance on restaurant food including fast food is: very excessive - fast food 1 to 2 times per day usually                                                                          Ingestion of sweet beverages eg soda, sweet tea, fruit juice: very excessive - reg soda fountain drinks with fast food meals and sweet tea at work throughout the day      Education    Provided Nutrition Guidelines for Bariatric and Metabolic Surgery   Reviewed guidelines for higher protein, limited carbohydrate diet to promote weight loss. Demonstrated means of counting protein and carbohydrate grams.   Educated patient to wisely choose an appropriate protein supplement beverage for the post-surgery liquid diet.  Provided product guidelines and examples.    Explained importance of goal setting to help in changing eating behaviors that are " not conducive to weight loss.  Specific macronutrient goals as below.   Provided follow-up options for support, including contact information for dietitians here.     Discussed importance of tracking grams of protein and carbohydrate in diet.  Web-based support information and apps for smart phones and computers given.        Nutrition Goals   Protein goal: 100 grams per day in three regular balanced meals and two to three high protein snacks each day, to ensure desired weight loss.   Carbohydrate goal:  100-140 grams per day  Beverage goal: Appropriate non-carbonated beverage intake.  Patient to wean self off of any sweet beverages, including soda.    Exercise Goals  Continue current exercise routine, if appropriate, and obtain approval from caregiver if physically limited for any reason.   Start activity plan per PCP/specialist advice if not currently exercising.     Recommend that team proceed with surgery and follow per protocol.   Marcela Neumann RD  04/03/2025  10:34 EDT

## 2025-04-04 ENCOUNTER — PATIENT ROUNDING (BHMG ONLY) (OUTPATIENT)
Dept: BARIATRICS/WEIGHT MGMT | Facility: CLINIC | Age: 30
End: 2025-04-04
Payer: COMMERCIAL

## 2025-04-04 LAB
25(OH)D3+25(OH)D2 SERPL-MCNC: 7 NG/ML (ref 30–100)
ALBUMIN SERPL-MCNC: 4.5 G/DL (ref 3.9–4.9)
ALP SERPL-CCNC: 64 IU/L (ref 44–121)
ALT SERPL-CCNC: 35 IU/L (ref 0–32)
AST SERPL-CCNC: 30 IU/L (ref 0–40)
BASOPHILS # BLD AUTO: 0 X10E3/UL (ref 0–0.2)
BASOPHILS NFR BLD AUTO: 0 %
BILIRUB SERPL-MCNC: 0.3 MG/DL (ref 0–1.2)
BUN SERPL-MCNC: 9 MG/DL (ref 6–20)
BUN/CREAT SERPL: 13 (ref 9–23)
CALCIUM SERPL-MCNC: 9.6 MG/DL (ref 8.7–10.2)
CHLORIDE SERPL-SCNC: 103 MMOL/L (ref 96–106)
CHOLEST SERPL-MCNC: 171 MG/DL (ref 100–199)
CO2 SERPL-SCNC: 24 MMOL/L (ref 20–29)
CREAT SERPL-MCNC: 0.7 MG/DL (ref 0.57–1)
EGFRCR SERPLBLD CKD-EPI 2021: 119 ML/MIN/1.73
EOSINOPHIL # BLD AUTO: 0.1 X10E3/UL (ref 0–0.4)
EOSINOPHIL NFR BLD AUTO: 1 %
ERYTHROCYTE [DISTWIDTH] IN BLOOD BY AUTOMATED COUNT: 12.6 % (ref 11.7–15.4)
GLOBULIN SER CALC-MCNC: 2.6 G/DL (ref 1.5–4.5)
GLUCOSE SERPL-MCNC: 92 MG/DL (ref 70–99)
HBA1C MFR BLD: 5.7 % (ref 4.8–5.6)
HCT VFR BLD AUTO: 40.2 % (ref 34–46.6)
HDLC SERPL-MCNC: 58 MG/DL
HGB BLD-MCNC: 13.1 G/DL (ref 11.1–15.9)
IMM GRANULOCYTES # BLD AUTO: 0 X10E3/UL (ref 0–0.1)
IMM GRANULOCYTES NFR BLD AUTO: 0 %
LDLC SERPL CALC-MCNC: 94 MG/DL (ref 0–99)
LYMPHOCYTES # BLD AUTO: 3 X10E3/UL (ref 0.7–3.1)
LYMPHOCYTES NFR BLD AUTO: 33 %
MCH RBC QN AUTO: 30.5 PG (ref 26.6–33)
MCHC RBC AUTO-ENTMCNC: 32.6 G/DL (ref 31.5–35.7)
MCV RBC AUTO: 94 FL (ref 79–97)
MONOCYTES # BLD AUTO: 0.5 X10E3/UL (ref 0.1–0.9)
MONOCYTES NFR BLD AUTO: 6 %
NEUTROPHILS # BLD AUTO: 5.5 X10E3/UL (ref 1.4–7)
NEUTROPHILS NFR BLD AUTO: 60 %
PLATELET # BLD AUTO: 276 X10E3/UL (ref 150–450)
POTASSIUM SERPL-SCNC: 5.1 MMOL/L (ref 3.5–5.2)
PROT SERPL-MCNC: 7.1 G/DL (ref 6–8.5)
RBC # BLD AUTO: 4.29 X10E6/UL (ref 3.77–5.28)
SODIUM SERPL-SCNC: 138 MMOL/L (ref 134–144)
TRIGL SERPL-MCNC: 104 MG/DL (ref 0–149)
TSH SERPL DL<=0.005 MIU/L-ACNC: 2.03 UIU/ML (ref 0.45–4.5)
UREA BREATH TEST QL: POSITIVE
VLDLC SERPL CALC-MCNC: 19 MG/DL (ref 5–40)
WBC # BLD AUTO: 9.1 X10E3/UL (ref 3.4–10.8)

## 2025-04-04 NOTE — PROGRESS NOTES
A [employer] message has been sent to the patient for PATIENT ROUNDING for [facility] [place] [facility] [place]/[facility].

## 2025-04-04 NOTE — PROGRESS NOTES
[facility] BARIATRIC SURGERY  [address]  [address]  [facility] [address] [phone]      Patient  Name:  Laura Riley  :  1995      Date of Visit: 2025      Chief Complaint:  weight gain; unable to maintain weight loss      History of Present Illness:  Laura Riley is a 30 y.o. female who presents today for evaluation, education and consultation regarding metabolic and bariatric surgery with Dr. Julio.     Laura has been overweight for at least 14 years.  Previous diet attempts include: High Protein.  The most weight Laura lost was 20 pounds but was unable to maintain that weight loss.  Her maximum lifetime weight is 307 pounds.       Complete history has been obtained and discussed today, as pertinent to metabolic/ bariatric surgery.     Past Medical History:   Diagnosis Date    Dyspepsia     Fatigue     Heartburn     chronic/episodic, no meds, UGI reportedly unremarkable    HTN (hypertension)     Morbid obesity      Past Surgical History:   Procedure Laterality Date     SECTION N/A 02/15/2024    Procedure:  SECTION PRIMARY;  Surgeon: Keturah Kern MD;  Location: [facility] LABOR DELIVERY;  Service: Obstetrics/Gynecology;  Laterality: N/A;    LAPAROSCOPIC CHOLECYSTECTOMY      (+) stones, required postop ERCP w/ stenting    WISDOM TOOTH EXTRACTION         Allergies   Allergen Reactions    Bactrim [Sulfamethoxazole-Trimethoprim] Hives       Current Outpatient Medications:     labetalol (NORMODYNE) 100 MG tablet, Take 1 tablet by mouth 2 (Two) Times a Day., Disp: 60 tablet, Rfl: 3    phentermine (Adipex-P) 37.5 MG tablet, Take 1 tablet by mouth Every Morning Before Breakfast., Disp: 90 tablet, Rfl: 0    Social History     Socioeconomic History    Marital status: Single   Tobacco Use    Smoking status: Never     Passive exposure: Never    Smokeless tobacco: Never   Vaping Use    Vaping status: Never Used   Substance and Sexual Activity    Alcohol  use: Not Currently    Drug use: Never    Sexual activity: Not Currently     Social History     Social History Narrative    Lives in [facility] [place] [facility].  Single w/ 3 children. Works @ [employer].        Family History   Problem Relation Age of Onset    Hypertension Mother     Heart attack Maternal Grandfather 79    Breast cancer Neg Hx     Colon cancer Neg Hx     Ovarian cancer Neg Hx     Uterine cancer Neg Hx        Review of Systems:  reviewed, unremarkable.     Physical Exam:  Vital Signs:  Weight: (!) 139 kg (307 lb)   Body mass index is 46.68 kg/m².  Temp: 97.8 °F (36.6 °C)   Heart Rate: 84   BP: 122/74     Physical Exam  Vitals reviewed.   Constitutional:       Appearance: She is well-developed.   HENT:      Head: Normocephalic and atraumatic.   Eyes:      General: No scleral icterus.     Conjunctiva/sclera: Conjunctivae normal.   Neck:      Thyroid: No thyromegaly.   Cardiovascular:      Rate and Rhythm: Normal rate and regular rhythm.      Heart sounds: No murmur heard.  Pulmonary:      Effort: Pulmonary effort is normal. No respiratory distress.      Breath sounds: Normal breath sounds. No wheezing or rales.   Abdominal:      General: Bowel sounds are normal. There is no distension.      Palpations: Abdomen is soft. There is no mass.      Tenderness: There is no abdominal tenderness.      Hernia: No hernia is present.      Comments: scars: lap rick, low transverse   Musculoskeletal:         General: Normal range of motion.      Cervical back: Neck supple.   Skin:     General: Skin is warm and dry.      Findings: No rash.   Neurological:      Mental Status: She is alert and oriented to person, place, and time.      Gait: Gait normal.   Psychiatric:         Judgment: Judgment normal.         Patient Active Problem List   Diagnosis     (normal spontaneous vaginal delivery)    HTN (hypertension)    Morbid obesity with BMI of 45.0-49.9, adult    Status post  section    Status post  bilateral salpingectomy    Fatigue    Dyspepsia    Heartburn       Assessment:  30 y.o. female with medically complicated obesity pursuing sleeve gastrectomy.    Metabolic & Bariatric Surgery is deemed medically necessary given the following: Class 3 Severe Obesity (BMI >=40). Obesity-related health conditions include the following:  HTN, fatigue, heartburn . Obesity is worsening. BMI is is above average; BMI management plan is completed. We discussed consulting a Bariatric surgeon.          Plan:  Further evaluation will include: CBC, CMP, Lipids, TSH, HgA1C, Vitamin D, H.Pylori, and EGD.    No additional clearances needed prior to surgery at this time.    Patient understands that bariatric surgery is not cosmetic surgery but rather a tool to help make a lifelong commitment to lifestyle changes including diet, exercise and behavior modifications.  The patient has been educated today on those expected postoperative lifestyle changes.  Psychological and Nutritional consultations will be completed prior to surgery.  Instructions on how to access [employer] (an internet based site w/ educational surgical videos) were given to the patient.  Recommended perioperative vitamin supplementation was reviewed.  The importance of avoiding ASA/ NSAIDS/ steroids/ tobacco/nicotine/ hormones/ immunomodulators perioperatively was discussed in detail.  All questions/concerns have been addressed.      Further input to follow pending the above.           GIORGIO Cerda

## 2025-04-06 NOTE — PROGRESS NOTES
PROGRESS NOTE    Data:    Laura Riley is a 30 y.o. female who met with the undersigned for a scheduled psychological evaluation from 9:00 - 9:45 am.      Clinical Maneuvering/Intervention:      Chief complaint and history of presenting illness/Problems: struggling with obesity for several years. Despite trying different weight loss plans and diets, the pt reported being unsuccessful in losing weight. A psychological evaluation was conducted in order to assess past and current level of functioning. Areas assessed included, but were not limited to: perception of social support, perception of ability to face and deal with challenges in life (positive functioning), anxiety symptoms, depressive symptoms, perspective on beliefs/belief system, coping skills for stress, intelligence level, addiction issues, etc. Therapeutic rapport was established. Interventions conducted today were geared towards assessing the pt's readiness for weight loss surgery and identifying and psychological contraindications for undergoing such a major life change. Social support was deemed strong (specific to weight loss surgery/weight loss in this manner and in a general sense): mother, children, co-workers, and friends. She works as a CNA with patient with dementia. She expressed that she likes her job, but is 'tired of being tired' having extra weight. She says that it leaves her fatigued and prevents her from being a more active mother, both of which bother her quite a bit. At the same time, current psychological struggles were described as low, mainly feeling frustrated with being overweight. She expressed that she otherwise tends to enjoy her life. Coping skills for distress and related to undergoing a major life change such as weight loss surgery/weight loss were deemed strong and included choosing to see good in life, finds humor in things, makes a point to do quality work, tends to be a responsible person, maintains quality  relationships with others, and makes a point to learn what will help her be successful with weight loss surgery. The pt endorsed having characteristics of readiness to undergo major life changes inherent in the journey of weight loss surgery. She could speak to having thought about it enough and waiting long enough to feeling ready psychologically for this surgery. The pt expressed gratitude for today's visit.     Past Family and Social History:      History of family mental health problems per pt: none endorsed    Psychosocial history: treatment of psychiatric care in the past (N/A), alcohol/substance abuse treatment in the past (N/A) , alcohol/substance abuse problems (N/A), inpatient psychiatric care (N/A).    Mental Status Exam (MSE):  Hygiene:  good  Dress: normal  Attitude:  cooperative and proactive  Motor Activity: normal  Speech: normal  Mood:   excited, focused  Affect:  congruent  Thought Processes: normal  Thought Content:  normal  Suicidal Thoughts:  not endorsed  Homicidal Thoughts:  not endorsed  Crisis Safety Plan: not needed   Hallucinations:  none      Patient's Support Network Includes:  family, friends      Progress toward goal: there is evidence to suggest that she is taking measures to improve the quality of her life including seeking weight loss surgery      Functional Status: high      Prognosis: good with weight loss surgery    Evaluation, Diagnoses, and Ability/Capacity to Respond to Treatment:      The pt presented to be struggling with frustrations with being overweight (BMI = 56.68, morbid obesity). The pt presented with good mental health; results of MSE demonstrated a functional status of high. Strengths: belief in self that she will be successful with weight loss surgery, etc (see detailed list of coping skills above). Needed for growth (CPT code requirement for Weaknesses): weight loss.      From a psychological standpoint, the pt presents as a good candidate for bariatric surgery.  She is motivated for the surgery, has showed readiness for the lifestyle change in terms of starting to adjust her eating habits, and seems to have appropriate expectations of how to prepare and how to live after surgery in order to lose weight successfully.    Treatment Plan:      Short term goals: Start improving her health by following up with her bariatric surgeon in order to receive weight loss surgery as soon as feasible/appropriate and demonstrate success with compliance to adhering to the recommended diet. Long term goals: reach a healthy weight and experience overall improved mood/energy via taking control over her health.    Amita Solis, PhD, LP

## (undated) DEVICE — TBG PENCL TELESCP MEGADYNE SMOKE EVAC 10FT

## (undated) DEVICE — SUT GUT CHRM 2/0 CT1 27IN 811H

## (undated) DEVICE — PATIENT RETURN ELECTRODE, SINGLE-USE, CONTACT QUALITY MONITORING, ADULT, WITH 9FT CORD, FOR PATIENTS WEIGING OVER 33LBS. (15KG): Brand: MEGADYNE

## (undated) DEVICE — HANDLE LARYNG F/O LED DISP: Brand: MEDLINE INDUSTRIES, INC.

## (undated) DEVICE — SUT VIC 2/0 CT1 27IN J339H BX/36

## (undated) DEVICE — BOWL UTIL STRL 32OZ

## (undated) DEVICE — COATED VICRYL  (POLYGLACTIN 910) SUTURE, VIOLET BRAIDED, STERILE, SYNTHETIC ABSORBABLE SUTURE: Brand: COATED VICRYL

## (undated) DEVICE — SUT PLAIN  3/0 CT1 27IN 842H

## (undated) DEVICE — TUBING WITH WAND

## (undated) DEVICE — SOL IRR NACL 0.9PCT BT 1000ML

## (undated) DEVICE — PROXIMATE RH ROTATING HEAD SKIN STAPLERS (35 WIDE) CONTAINS 35 STAINLESS STEEL STAPLES: Brand: PROXIMATE

## (undated) DEVICE — TRAP FLD MINIVAC MEGADYNE 100ML

## (undated) DEVICE — NDL HYPO ECLPS SFTY 18G 1 1/2IN

## (undated) DEVICE — TRY SPINE BLCK WHITACRE 25G 3X5IN

## (undated) DEVICE — PK C/SECT 10

## (undated) DEVICE — SYR ART BLD GAS HEP 1CC

## (undated) DEVICE — CLTH CLENS READYCLEANSE PERI CARE PK/5

## (undated) DEVICE — SOL IRR H2O BTL 1000ML STRL

## (undated) DEVICE — MAT PREVALON MOBL TRANSFR AIR W/PAD REPROC 39X81IN

## (undated) DEVICE — APPL CHLORAPREP TINTED 26ML TEAL

## (undated) DEVICE — SUT GUT CHRM 1 CTX 36IN 905H

## (undated) DEVICE — GLV SURG BIOGEL LTX PF 6

## (undated) DEVICE — 4-PORT MANIFOLD: Brand: NEPTUNE 2